# Patient Record
Sex: FEMALE | Race: WHITE | URBAN - METROPOLITAN AREA
[De-identification: names, ages, dates, MRNs, and addresses within clinical notes are randomized per-mention and may not be internally consistent; named-entity substitution may affect disease eponyms.]

---

## 2024-08-11 NOTE — PATIENT INSTRUCTIONS
Welcome to Dr. Tao's clinic. We are here to assist you through your ENT care at [facility]. Dr. Tao is an Ear surgeon. This means that she specializes in taking care of patients with complex ear problems.     Dr. Tao's office number is [phone]. While you may see her at a satellite office, she has a team committed to help meet your healthcare needs at [facility]'s main [place]. This number is the most direct way to communicate with the office.     Larissa is Dr. Tao's  and she answers the office phone from 8am-4pm Mon-Fri. She can help you with many general questions and information. Questions that she cannot answer will be directed to the appropriate staff. You may need to leave a message. In this case, someone from the team will call you back.     Sigifredo Bell RN, is Dr. Tao's primary nurse and can be reached by calling the office. Sigifredo is in clinic with Dr. Tao's on Mondays and Tuesdays. Non-urgent calls will be returned on non-clinic days typically Thursdays.     Sometimes, other team members will also be involved in your care. These people may include dieticians, social workers, speech therapists, audiologist, neurologist, and physical therapist. Dr. Tao will provide these referrals as needed. Please let her know if you would like to request a specific referral.     For your convenience, Dr. Tao sees patients at several [facility] locations including [facility] and [facility] at the main campus of [facility]. While we try to make your appointments as convenient as possible, occasionally a visit to another location may be necessary to provide the best care for you. We look forward to working with you to meet your healthcare goals.     Dr. Tao makes every effort to run on time for your appointments. Therefore, if you are more than 30 minutes late unrelated to a scan or another appointment such therapy or audio you will have to  reschedule.

## 2024-08-11 NOTE — PROGRESS NOTES
History Of Present Illness:  Juana Motley is a 49 y.o. female who presents to me as a new patient for eustachian tube dysfunction, referred here today by Dr. Lr. She has a history of pneumococcal meningitis treated at [place] in February 2024, presumed secondary to AOM as she had right ear pain in the 24hr preceding onset of meningitis symptoms. She had a right ear tube placed and has since been following up with Dr. Lr for post-hospital care. She said the tube was replaced twice because Dr. Lr said it was blocked and/or not working based on tympanometry. She was told there was concern for recurrent meningitis if her tube is not working appropriately, which led to her referral here today. She is having some right ear fullness but is otherwise without significant symptoms, including otalgia, otorrhea, and vertigo. She reports recurrent ear infections over her life. She does report having decreased hearing on the right for many years.     Past Medical History:  She has no past medical history on file.    Surgical History:  She has no past surgical history on file.     Social History:  She reports that she has quit smoking. Her smoking use included cigarettes. She uses smokeless tobacco. She reports that she does not drink alcohol and does not use drugs.    Family History:  No family history on file.     Medications:  Current Outpatient Medications   Medication Instructions    acetaminophen (TYLENOL) 650 mg    aspirin 81 mg, oral, Daily    atomoxetine (STRATTERA) 18 mg, oral, Every morning    fluconazole (Diflucan) 100 mg tablet 1 tablet, oral, Daily (0630)    fLuvoxaMINE (LUVOX) 100 mg, oral, Nightly    FreeStyle Ignacio 2 Sensor kit     hydroCHLOROthiazide (Microzide) 12.5 mg tablet     HYDROcodone-acetaminophen (Norco) 7.5-325 mg tablet 1 tablet, oral, Every 6 hours    OneTouch Delica Plus Lancet 30 gauge misc     OneTouch Verio test strips strip     rosuvastatin (CRESTOR) 5 mg    Synthroid 100 mcg  "tablet         Allergies:  Metformin, Penicillin, and Topiramate    Review of Systems:   A comprehensive 10-point review of systems was obtained including constitutional, neurological, HEENT, pulmonary, cardiovascular, genito-urinary, and other pertinent systems and was negative except as noted in the HPI.      Physical Exam:  Constitutional   General appearance: Healthy-appearing, well-nourished, well groomed, in no acute distress.   Ability to communicate: Normal communication without aids, normal voice quality.       Head and face: Atraumatic with no masses, lesions, or scarring.   Facial strength: Normal strength and symmetry, no synkinesis or facial tic.     Ears  Otoscopic examination: Narrow ear canals bilaterally; right PET in posterosuperior quadrant, appears occluded with cerumen, no obvious middle ear effusion. The ear tube was removed, revealing a layer of scar tissue deep to the tube that does not allow visualization into the middle ear space. The TM is mobile on pneumatic otoscopy.   Normal otoscopy of the left tympanic membrane, no effusion.      Nose: Dorsum symmetric with no visible or palpable deformities.     Oral Cavity/Mouth  Lips, teeth, and gums: Normal lips, gums, and dentition.     Oropharynx: Mucosa moist, no lesions.     Neck: Symmetrical, trachea midline. No masses visible.     Neurological/Psychiatric  Cranial Nerve Examination: II - XII grossly intact.  Orientation to person, place, and time: Normal.  Mood and affect: Normal.     Skin: Normal without rashes or lesions.     Pulmonary  Respiratory effort: Chest expands symmetrically.     Extremities: Appearance of extremities: Normal. Gait normal.     Procedure:  None    Last Recorded Vitals:  Temperature 36.5 °C (97.7 °F), height 1.676 m (5' 6\"), weight 96.8 kg (213 lb 4.8 oz).    Relevant Results:  I personally reviewed the audiogram from 8/12/24 which demonstrated left normal hearing with 92% WRS, type A tympanogram. On the right, " there is a mild conductive hearing loss greater in the lower frequencies, with 20dB ABG at 500Hz and 30db ABG at 1000Hz. %, Type vB tympanogram with normal ECV.     Assessment/Plan   49 y.o. female who presents to me as a new patient, referred here today by Dr. Lr, with right chronic otitis media and a history of pneumococcal meningitis treated in 02/2024. Her previously placed right ear tube was occluded and removed today. She has no obvious evidence of effusion, but there is a layer of scar tissue deep to the ear tube that precludes visualization of the middle ear space. Her conductive hearing loss on the right side may be related to scar tissue/adhesions and ossicular stiffening, but this would be better evaluated with CT imaging. She was also reassured that it is highly unlikely to have recurrent meningitis related to AOM, but her CT scan will also show us if she has any tegmen defects that could predispose her to these infections.     We ordered a CT IAC and Prevnar vaccine for her today. We will contact her after imaging is completed and discuss a follow-up plan according to the results.           I saw and evaluated the patient. I personally obtained the key and critical portions of the history and physical exam or was physically present for key and critical portions performed by the resident/fellow. I reviewed the resident/fellow's documentation and discussed the patient with the resident/fellow. I agree with the resident/fellow's medical decision making as documented in the note.    Cristela Tao MD

## 2024-08-12 ENCOUNTER — APPOINTMENT (OUTPATIENT)
Dept: OTOLARYNGOLOGY | Facility: CLINIC | Age: 49
End: 2024-08-12
Payer: COMMERCIAL

## 2024-08-12 ENCOUNTER — TELEPHONE (OUTPATIENT)
Dept: OTOLARYNGOLOGY | Facility: CLINIC | Age: 49
End: 2024-08-12

## 2024-08-12 ENCOUNTER — CLINICAL SUPPORT (OUTPATIENT)
Dept: AUDIOLOGY | Facility: CLINIC | Age: 49
End: 2024-08-12
Payer: COMMERCIAL

## 2024-08-12 VITALS — WEIGHT: 213.3 LBS | HEIGHT: 66 IN | TEMPERATURE: 97.7 F | BODY MASS INDEX: 34.28 KG/M2

## 2024-08-12 DIAGNOSIS — H65.31 CHRONIC MUCOID OTITIS MEDIA OF RIGHT EAR: ICD-10-CM

## 2024-08-12 DIAGNOSIS — H90.11 CONDUCTIVE HEARING LOSS OF RIGHT EAR WITH UNRESTRICTED HEARING OF LEFT EAR: ICD-10-CM

## 2024-08-12 DIAGNOSIS — T85.618A NON-FUNCTIONING TYMPANOSTOMY TUBE, INITIAL ENCOUNTER: Primary | ICD-10-CM

## 2024-08-12 DIAGNOSIS — H90.11 CONDUCTIVE HEARING LOSS OF RIGHT EAR WITH UNRESTRICTED HEARING OF LEFT EAR: Primary | ICD-10-CM

## 2024-08-12 DIAGNOSIS — G00.2: ICD-10-CM

## 2024-08-12 PROCEDURE — 92557 COMPREHENSIVE HEARING TEST: CPT

## 2024-08-12 PROCEDURE — 99205 OFFICE O/P NEW HI 60 MIN: CPT | Performed by: OTOLARYNGOLOGY

## 2024-08-12 PROCEDURE — 92504 EAR MICROSCOPY EXAMINATION: CPT | Performed by: OTOLARYNGOLOGY

## 2024-08-12 PROCEDURE — 3008F BODY MASS INDEX DOCD: CPT | Performed by: OTOLARYNGOLOGY

## 2024-08-12 PROCEDURE — 92550 TYMPANOMETRY & REFLEX THRESH: CPT

## 2024-08-12 RX ORDER — LANCETS 33 GAUGE
EACH MISCELLANEOUS
COMMUNITY
Start: 2023-11-24

## 2024-08-12 RX ORDER — PNEUMOCOCCAL VACCINE POLYVALENT 25; 25; 25; 25; 25; 25; 25; 25; 25; 25; 25; 25; 25; 25; 25; 25; 25; 25; 25; 25; 25; 25; 25 UG/.5ML; UG/.5ML; UG/.5ML; UG/.5ML; UG/.5ML; UG/.5ML; UG/.5ML; UG/.5ML; UG/.5ML; UG/.5ML; UG/.5ML; UG/.5ML; UG/.5ML; UG/.5ML; UG/.5ML; UG/.5ML; UG/.5ML; UG/.5ML; UG/.5ML; UG/.5ML; UG/.5ML; UG/.5ML; UG/.5ML
0.5 INJECTION, SOLUTION INTRAMUSCULAR; SUBCUTANEOUS ONCE
Qty: 0.5 ML | Refills: 0 | Status: SHIPPED | OUTPATIENT
Start: 2024-08-12 | End: 2024-08-12 | Stop reason: ENTERED-IN-ERROR

## 2024-08-12 RX ORDER — ATOMOXETINE 18 MG/1
18 CAPSULE ORAL EVERY MORNING
COMMUNITY
Start: 2024-08-02

## 2024-08-12 RX ORDER — ROSUVASTATIN CALCIUM 5 MG/1
5 TABLET, COATED ORAL
COMMUNITY
Start: 2022-11-14

## 2024-08-12 RX ORDER — LEVOTHYROXINE SODIUM 100 UG/1
TABLET ORAL
COMMUNITY
Start: 2024-05-14

## 2024-08-12 RX ORDER — FLUVOXAMINE MALEATE 100 MG/1
100 TABLET, COATED ORAL NIGHTLY
COMMUNITY

## 2024-08-12 RX ORDER — ASPIRIN 81 MG/1
81 TABLET ORAL DAILY
COMMUNITY

## 2024-08-12 RX ORDER — HYDROCODONE BITARTRATE AND ACETAMINOPHEN 7.5; 325 MG/1; MG/1
1 TABLET ORAL EVERY 6 HOURS
COMMUNITY
Start: 2024-07-23

## 2024-08-12 RX ORDER — FLUCONAZOLE 100 MG/1
1 TABLET ORAL
COMMUNITY
Start: 2024-02-23

## 2024-08-12 RX ORDER — HYDROCHLOROTHIAZIDE 12.5 MG/1
TABLET ORAL
COMMUNITY
Start: 2024-07-21

## 2024-08-12 RX ORDER — BLOOD-GLUCOSE METER
EACH MISCELLANEOUS
COMMUNITY
Start: 2023-12-31

## 2024-08-12 RX ORDER — GUAIFENESIN 1200 MG
650 TABLET, EXTENDED RELEASE 12 HR ORAL
COMMUNITY
Start: 2024-02-20

## 2024-08-12 RX ORDER — PNEUMOCOCCAL 20-VALENT CONJUGATE VACCINE 2.2; 2.2; 2.2; 2.2; 2.2; 2.2; 2.2; 2.2; 2.2; 2.2; 2.2; 2.2; 2.2; 2.2; 2.2; 2.2; 4.4; 2.2; 2.2; 2.2 UG/.5ML; UG/.5ML; UG/.5ML; UG/.5ML; UG/.5ML; UG/.5ML; UG/.5ML; UG/.5ML; UG/.5ML; UG/.5ML; UG/.5ML; UG/.5ML; UG/.5ML; UG/.5ML; UG/.5ML; UG/.5ML; UG/.5ML; UG/.5ML; UG/.5ML; UG/.5ML
0.5 INJECTION, SUSPENSION INTRAMUSCULAR ONCE
Qty: 0.5 ML | Refills: 0 | Status: SHIPPED | OUTPATIENT
Start: 2024-08-12 | End: 2024-08-12

## 2024-08-12 RX ORDER — FLASH GLUCOSE SENSOR
KIT MISCELLANEOUS
COMMUNITY
Start: 2024-05-28

## 2024-08-12 ASSESSMENT — PATIENT HEALTH QUESTIONNAIRE - PHQ9
SUM OF ALL RESPONSES TO PHQ9 QUESTIONS 1 AND 2: 0
2. FEELING DOWN, DEPRESSED OR HOPELESS: NOT AT ALL
1. LITTLE INTEREST OR PLEASURE IN DOING THINGS: NOT AT ALL

## 2024-08-12 ASSESSMENT — PAIN SCALES - GENERAL: PAINLEVEL: 0-NO PAIN

## 2024-08-12 NOTE — LETTER
August 12, 2024     Santiago Lr MD  [address] [facility]  [address]  [place] [address]    Patient: Juana Motley   YOB: 1975   Date of Visit: 8/12/2024       Dear Dr. Santiago Lr MD:    Thank you for referring Juana Motley to me for evaluation. Below are my notes for this consultation.  If you have questions, please do not hesitate to call me. I look forward to following your patient along with you.       Sincerely,     Cristela Tao MD      CC: No Recipients  ______________________________________________________________________________________    History Of Present Illness:  Juana Motley is a 49 y.o. female who presents to me as a new patient for eustachian tube dysfunction, referred here today by Dr. Lr. She has a history of pneumococcal meningitis treated at [place] in February 2024, presumed secondary to AOM as she had right ear pain in the 24hr preceding onset of meningitis symptoms. She had a right ear tube placed and has since been following up with Dr. Lr for post-hospital care. She said the tube was replaced twice because Dr. Lr said it was blocked and/or not working based on tympanometry. She was told there was concern for recurrent meningitis if her tube is not working appropriately, which led to her referral here today. She is having some right ear fullness but is otherwise without significant symptoms, including otalgia, otorrhea, and vertigo. She reports recurrent ear infections over her life. She does report having decreased hearing on the right for many years.     Past Medical History:  She has no past medical history on file.    Surgical History:  She has no past surgical history on file.     Social History:  She reports that she has quit smoking. Her smoking use included cigarettes. She uses smokeless tobacco. She reports that she does not drink alcohol and does not use drugs.    Family History:  No family history on  file.     Medications:  Current Outpatient Medications   Medication Instructions   • acetaminophen (TYLENOL) 650 mg   • aspirin 81 mg, oral, Daily   • atomoxetine (STRATTERA) 18 mg, oral, Every morning   • fluconazole (Diflucan) 100 mg tablet 1 tablet, oral, Daily (0630)   • fLuvoxaMINE (LUVOX) 100 mg, oral, Nightly   • FreeStyle Ignacio 2 Sensor kit    • hydroCHLOROthiazide (Microzide) 12.5 mg tablet    • HYDROcodone-acetaminophen (Norco) 7.5-325 mg tablet 1 tablet, oral, Every 6 hours   • OneTouch Delica Plus Lancet 30 gauge misc    • OneTouch Verio test strips strip    • rosuvastatin (CRESTOR) 5 mg   • Synthroid 100 mcg tablet         Allergies:  Metformin, Penicillin, and Topiramate    Review of Systems:   A comprehensive 10-point review of systems was obtained including constitutional, neurological, HEENT, pulmonary, cardiovascular, genito-urinary, and other pertinent systems and was negative except as noted in the HPI.      Physical Exam:  Constitutional   General appearance: Healthy-appearing, well-nourished, well groomed, in no acute distress.   Ability to communicate: Normal communication without aids, normal voice quality.       Head and face: Atraumatic with no masses, lesions, or scarring.   Facial strength: Normal strength and symmetry, no synkinesis or facial tic.     Ears  Otoscopic examination: Narrow ear canals bilaterally; right PET in posterosuperior quadrant, appears occluded with cerumen, no obvious middle ear effusion. The ear tube was removed, revealing a layer of scar tissue deep to the tube that does not allow visualization into the middle ear space. The TM is mobile on pneumatic otoscopy.   Normal otoscopy of the left tympanic membrane, no effusion.      Nose: Dorsum symmetric with no visible or palpable deformities.     Oral Cavity/Mouth  Lips, teeth, and gums: Normal lips, gums, and dentition.     Oropharynx: Mucosa moist, no lesions.     Neck: Symmetrical, trachea midline. No masses  "visible.     Neurological/Psychiatric  Cranial Nerve Examination: II - XII grossly intact.  Orientation to person, place, and time: Normal.  Mood and affect: Normal.     Skin: Normal without rashes or lesions.     Pulmonary  Respiratory effort: Chest expands symmetrically.     Extremities: Appearance of extremities: Normal. Gait normal.     Procedure:  None    Last Recorded Vitals:  Temperature 36.5 °C (97.7 °F), height 1.676 m (5' 6\"), weight 96.8 kg (213 lb 4.8 oz).    Relevant Results:  I personally reviewed the audiogram from 8/12/24 which demonstrated left normal hearing with 92% WRS, type A tympanogram. On the right, there is a mild conductive hearing loss greater in the lower frequencies, with 20dB ABG at 500Hz and 30db ABG at 1000Hz. %, Type vB tympanogram with normal ECV.     Assessment/Plan  49 y.o. female who presents to me as a new patient, referred here today by Dr. Lr, with right chronic otitis media and a history of pneumococcal meningitis treated in 02/2024. Her previously placed right ear tube was occluded and removed today. She has no obvious evidence of effusion, but there is a layer of scar tissue deep to the ear tube that precludes visualization of the middle ear space. Her conductive hearing loss on the right side may be related to scar tissue/adhesions and ossicular stiffening, but this would be better evaluated with CT imaging. She was also reassured that it is highly unlikely to have recurrent meningitis related to AOM, but her CT scan will also show us if she has any tegmen defects that could predispose her to these infections.     We ordered a CT IAC and Prevnar vaccine for her today. We will contact her after imaging is completed and discuss a follow-up plan according to the results.           I saw and evaluated the patient. I personally obtained the key and critical portions of the history and physical exam or was physically present for key and critical portions performed by " the resident/fellow. I reviewed the resident/fellow's documentation and discussed the patient with the resident/fellow. I agree with the resident/fellow's medical decision making as documented in the note.    Cristela Tao MD

## 2024-08-12 NOTE — PROGRESS NOTES
ADULT AUDIOLOGY EVALUATION    Name:  Juana Motley  :  1975  Age:  49 y.o.  Date of Evaluation:  24    IMPRESSIONS     Today's test results indicate normal middle ear functioning with normal hearing sensitivity in the left ear, and no measurable middle ear compliance, with normal to mild conductive hearing loss in the right ear. Word recognition is excellent in both ears.      RECOMMENDATIONS     Continue medical follow up with ENT.  Return for annual hearing evaluation in conjunction with medical management, or sooner if new concerns arise.    Time: 8692-9498    HISTORY     Juana Motley is seen today for an audiologic evaluation in conjunction with otolaryngology. Patient reports having meningitis in February, which originated form an infection in the mastoid bone. She has a history of eustachian tube dysfunction and frequent ear infections as a child, and presents today with a PE tube in the right ear. She mentioned that this is her third tube that has been placed in this ear. Additionally, Juana reported a history of right mastoidectomy, performed around 20 years ago.    Patient reports ongoing fullness and pressure in the right ear, as well as intermittent tinnitus. She endorsed experiencing dizziness in the past that she believes may be related to low blood pressure. She denied recent drainage from her ears. Patient denied otalgia, and history of loud noise exposure.       TEST RESULTS     Otoscopic Evaluation:  Right Ear: Clear ear canal, PE tube visualized and in place.  Left Ear: Clear ear canal with unremarkable tympanic membrane    Tympanometry:   Right Ear: Flat, type B tympanogram with normal ear canal volume, no peak pressure or compliance, consistent with blocked or extruded PE tube.   Left Ear: Normal, type A tympanogram with normal ear canal volume, peak pressure and compliance.     Ipsilateral Acoustic Reflexes:   Right Ear: Could not test due to PE tube status.  Left Ear:  Present 500-4000 Hz.     Pure Tone Audiometry (125-8000 Hz):     Right Ear: Mild conductive hearing loss from 125-1000 Hz, rising to normal hearing sensitivity at 2000 Hz, and falling back to mild from 2475-9204 Hz. 20-30 dB conductive component present in the low frequencies.    Left Ear: Normal hearing sensitivity from 125-8000 Hz. No air-bone gaps present.    Speech Audiometry:   Right Ear:    Speech Reception Threshold (SRT) was obtained at 30 dBHL using monitored live voice (MLV).   Word Recognition scores were excellent (100%) in quiet when words were presented at 70 dBHL using recorded NU-6 word list ordered by difficulty.  Left Ear:    Speech Reception Threshold (SRT) was obtained at 5 dBHL using monitored live voice (MLV).   Word Recognition scores were excellent (92%) in quiet when words were presented at 50 dBHL using recorded NU-6 word list ordered by difficulty.         Gisele Beinto, Ruel, CCC-A  Clinical Audiologist    Degree of Hearing Sensitivity Decibel Range   Within Normal Limits (WNL) 0-25   Mild 26-40   Moderate 41-55   Moderately-Severe 56-70   Severe 71-90   Profound 91+      Key   CNT/DNT Could Not Test/Did Not Test   TM Tympanic Membrane   WNL Within Normal Limits   HA Hearing Aid   SNHL Sensorineural Hearing Loss   CHL Conductive Hearing Loss   NIHL Noise-Induced Hearing Loss   ECV Ear Canal Volume   MLV Monitored Live Voice     AUDIOGRAM

## 2024-08-12 NOTE — TELEPHONE ENCOUNTER
Patient's insurance company contacted for CT IAC prior authorizations. Patient's case ID is [id number], authorization approved #[id number] from 8/12/24-02/08/25. CT order faxed to [facility] [phone].

## 2024-08-26 ENCOUNTER — HOSPITAL ENCOUNTER (OUTPATIENT)
Dept: RADIOLOGY | Facility: EXTERNAL LOCATION | Age: 49
Discharge: HOME | End: 2024-08-26
Payer: COMMERCIAL

## 2024-09-06 ENCOUNTER — APPOINTMENT (OUTPATIENT)
Dept: OTOLARYNGOLOGY | Facility: CLINIC | Age: 49
End: 2024-09-06
Payer: COMMERCIAL

## 2024-09-06 DIAGNOSIS — H66.11 CHRONIC TUBOTYMPANIC SUPPURATIVE OTITIS MEDIA OF RIGHT EAR: ICD-10-CM

## 2024-09-06 DIAGNOSIS — Z86.61 HISTORY OF BACTERIAL MENINGITIS: Primary | ICD-10-CM

## 2024-09-06 DIAGNOSIS — Q01.9 ENCEPHALOCELE (MULTI): ICD-10-CM

## 2024-09-06 PROCEDURE — 99213 OFFICE O/P EST LOW 20 MIN: CPT | Performed by: OTOLARYNGOLOGY

## 2024-09-06 ASSESSMENT — PATIENT HEALTH QUESTIONNAIRE - PHQ9
SUM OF ALL RESPONSES TO PHQ9 QUESTIONS 1 AND 2: 0
1. LITTLE INTEREST OR PLEASURE IN DOING THINGS: NOT AT ALL
2. FEELING DOWN, DEPRESSED OR HOPELESS: NOT AT ALL

## 2024-09-06 NOTE — PROGRESS NOTES
Virtual or Telephone Consent    An interactive audio and video telecommunication system which permits real time communications between the patient (at the originating site) and provider (at the distant site) was utilized to provide this telehealth service.   Verbal consent was requested and obtained from Juana Motley on this date, 09/06/24 for a telehealth visit.         History Of Present Illness:  Juana Motley is a 49 y.o. female with a history of meningitis and AOM  Here to discuss CT results via VV. .    Recall: Juana Motley is a 49 y.o. female who presents to me as a new patient for eustachian tube dysfunction, referred here today by Dr. Lr. 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 She had a right ear tube placed and has since been following up with Dr. Lr for post-hospital care. She said the tube was replaced twice because Dr. Lr said it was blocked and/or not working based on tympanometry. She was told there was concern for recurrent meningitis if her tube is not working appropriately, which led to her referral here today. She is having some right ear fullness but is otherwise without significant symptoms, including otalgia, otorrhea, and vertigo. She reports recurrent ear infections over her life. She does report having decreased hearing on the right for many years. :      Surgical History:  She has no past surgical history on file.     Allergies:  Metformin, Penicillin, and Topiramate    Medications:   Current Outpatient Medications   Medication Instructions    acetaminophen (TYLENOL) 650 mg    aspirin 81 mg, oral, Daily    atomoxetine (STRATTERA) 18 mg, oral, Every morning    fluconazole (Diflucan) 100 mg tablet 1 tablet, oral, Daily (0630)    fLuvoxaMINE (LUVOX) 50 mg, oral, Nightly    FreeStyle Ignacio 2 Sensor kit      hydroCHLOROthiazide (Microzide) 12.5 mg tablet     HYDROcodone-acetaminophen (Norco) 7.5-325 mg tablet 1 tablet, oral, Every 6 hours    OneTouch Delica Plus Lancet 30 gauge misc     OneTouch Verio test strips strip     rosuvastatin (CRESTOR) 5 mg    Synthroid 100 mcg tablet       Review of Systems:   A comprehensive 10-point review of systems was obtained including constitutional, neurological, HEENT, pulmonary, cardiovascular, genito-urinary, and other pertinent systems and was negative except as noted in the HPI.      Physical Exam:  Constitutional   General appearance: Healthy-appearing, well-nourished, well groomed, in no acute distress.   Ability to communicate: Normal communication without aids, normal voice quality.       Head and face: Atraumatic with no masses, lesions, or scarring.   Facial strength: Normal strength and symmetry, no synkinesis or facial tic.     Pulmonary  Respiratory effort: Chest expands symmetrically.       Relevant Results:  I reviewed the CT images which demonstrate a large tegmen tympani dehiscence and ossicles at the level of the edge of the bone. There is also soft tissue opacification of the ME space.      Assessment/Plan   48 yo with h/o meningitis and AOM. 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   We discussed the need for MCF and repair from above because of her anatomy and location of the defect.   We discussed the risks and benefits of MCF repair. These risks include damage to the ear bone, damage to the inner ear, hearing loss, dizziness, facial nerve injury, and taste disturbance. The patient would like to discuss this with her family and will contact me if she chooses to proceed with surgery. We did discuss the increased risk of recurrent meningitis with a persistent defect. She hasn't gotten the pneumovax but has RX for it.         Cristela Tao MD

## 2024-09-06 NOTE — LETTER
September 6, 2024       No Recipients    Patient: Juana Motley   YOB: 1975   Date of Visit: 9/6/2024       Dear Dr. Baxter Recipients:    I had the pleasure of seeing your patient, Juana Motley today. Below are my notes for this consultation.  If you have questions, please do not hesitate to call me. Thank you for allowing me to participate in the care of your patient.        Sincerely,     Cristela Tao MD      CC:   No Recipients  _____________________________________________________________________________    48 yo with h/o meningitis and AOM. 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   We discussed the need for MCF and repair from above because of her anatomy and location of the defect.   We discussed the risks and benefits of MCF repair. These risks include damage to the ear bone, damage to the inner ear, hearing loss, dizziness, facial nerve injury, and taste disturbance. The patient would like to discuss this with her family and will contact me if she chooses to proceed with surgery. We did discuss the increased risk of recurrent meningitis with a persistent defect. She hasn't gotten the pneumovax but has RX for it.

## 2024-10-02 DIAGNOSIS — Z86.61 HISTORY OF BACTERIAL MENINGITIS: ICD-10-CM

## 2024-10-02 DIAGNOSIS — Q01.9 ENCEPHALOCELE: ICD-10-CM

## 2024-10-02 DIAGNOSIS — H90.11 CONDUCTIVE HEARING LOSS OF RIGHT EAR WITH UNRESTRICTED HEARING OF LEFT EAR: ICD-10-CM

## 2024-10-03 ENCOUNTER — OFFICE VISIT (OUTPATIENT)
Dept: NEUROSURGERY | Facility: CLINIC | Age: 49
End: 2024-10-03
Payer: COMMERCIAL

## 2024-10-03 ENCOUNTER — PREP FOR PROCEDURE (OUTPATIENT)
Dept: NEUROSURGERY | Facility: CLINIC | Age: 49
End: 2024-10-03

## 2024-10-03 VITALS
HEART RATE: 82 BPM | HEIGHT: 65 IN | TEMPERATURE: 98.3 F | SYSTOLIC BLOOD PRESSURE: 129 MMHG | BODY MASS INDEX: 33.49 KG/M2 | WEIGHT: 201 LBS | DIASTOLIC BLOOD PRESSURE: 85 MMHG

## 2024-10-03 DIAGNOSIS — H66.11 CHRONIC TUBOTYMPANIC SUPPURATIVE OTITIS MEDIA OF RIGHT EAR: ICD-10-CM

## 2024-10-03 DIAGNOSIS — Q01.9 ENCEPHALOCELE: Primary | ICD-10-CM

## 2024-10-03 PROBLEM — H60.509 ACUTE OTITIS EXTERNA: Status: ACTIVE | Noted: 2024-10-03

## 2024-10-03 PROBLEM — E03.9 HYPOTHYROIDISM: Status: ACTIVE | Noted: 2023-09-18

## 2024-10-03 PROBLEM — R51.9 HEADACHE: Status: ACTIVE | Noted: 2024-10-03

## 2024-10-03 PROBLEM — K43.9 HERNIA OF ANTERIOR ABDOMINAL WALL: Status: ACTIVE | Noted: 2024-01-11

## 2024-10-03 PROBLEM — M67.50 PLICA SYNDROME: Status: ACTIVE | Noted: 2017-04-13

## 2024-10-03 PROBLEM — R26.9 ABNORMAL GAIT: Status: ACTIVE | Noted: 2024-10-03

## 2024-10-03 PROBLEM — J02.9 ACUTE PHARYNGITIS: Status: ACTIVE | Noted: 2024-08-19

## 2024-10-03 PROBLEM — I25.10 ATHEROSCLEROSIS OF CORONARY ARTERY: Status: ACTIVE | Noted: 2024-06-17

## 2024-10-03 PROBLEM — M54.12 CERVICAL RADICULOPATHY: Status: ACTIVE | Noted: 2023-05-18

## 2024-10-03 PROBLEM — E66.9 OBESITY: Status: ACTIVE | Noted: 2024-02-18

## 2024-10-03 PROBLEM — E88.810 METABOLIC SYNDROME X: Status: ACTIVE | Noted: 2024-10-03

## 2024-10-03 PROBLEM — M47.812 SPONDYLOSIS OF CERVICAL SPINE WITHOUT MYELOPATHY: Status: ACTIVE | Noted: 2023-05-18

## 2024-10-03 PROBLEM — F32.A DEPRESSIVE DISORDER: Status: ACTIVE | Noted: 2024-10-03

## 2024-10-03 PROBLEM — E11.9 TYPE 2 DIABETES MELLITUS: Status: ACTIVE | Noted: 2023-12-11

## 2024-10-03 PROBLEM — E55.9 VITAMIN D DEFICIENCY: Status: ACTIVE | Noted: 2024-10-03

## 2024-10-03 PROBLEM — K80.50 BILIARY COLIC: Status: ACTIVE | Noted: 2024-10-03

## 2024-10-03 PROBLEM — E07.9 DISORDER OF THYROID GLAND: Status: ACTIVE | Noted: 2024-10-03

## 2024-10-03 PROBLEM — E87.6 HYPOKALEMIA: Status: ACTIVE | Noted: 2024-02-17

## 2024-10-03 PROBLEM — J32.9 SINUSITIS: Status: ACTIVE | Noted: 2024-10-03

## 2024-10-03 PROBLEM — M54.40 LOW BACK PAIN WITH SCIATICA: Status: ACTIVE | Noted: 2023-05-18

## 2024-10-03 PROBLEM — H90.11 CONDUCTIVE HEARING LOSS, UNILATERAL, RIGHT EAR, WITH UNRESTRICTED HEARING ON THE CONTRALATERAL SIDE: Status: ACTIVE | Noted: 2024-08-16

## 2024-10-03 PROBLEM — M26.609 TEMPOROMANDIBULAR JOINT DISORDER: Status: ACTIVE | Noted: 2024-10-03

## 2024-10-03 PROBLEM — R41.82 ALTERED MENTAL STATUS: Status: ACTIVE | Noted: 2024-02-13

## 2024-10-03 PROBLEM — I10 ESSENTIAL HYPERTENSION: Status: ACTIVE | Noted: 2024-10-03

## 2024-10-03 PROBLEM — R00.1 SINUS BRADYCARDIA: Status: ACTIVE | Noted: 2024-02-18

## 2024-10-03 PROBLEM — G03.9 MENINGITIS (HHS-HCC): Status: ACTIVE | Noted: 2024-02-17

## 2024-10-03 PROBLEM — H66.90 ACUTE OTITIS MEDIA: Status: ACTIVE | Noted: 2024-02-17

## 2024-10-03 PROBLEM — E78.2 MIXED HYPERLIPIDEMIA: Status: ACTIVE | Noted: 2024-06-17

## 2024-10-03 PROCEDURE — 99214 OFFICE O/P EST MOD 30 MIN: CPT | Mod: 57 | Performed by: NEUROLOGICAL SURGERY

## 2024-10-03 PROCEDURE — 3074F SYST BP LT 130 MM HG: CPT | Performed by: NEUROLOGICAL SURGERY

## 2024-10-03 PROCEDURE — 3079F DIAST BP 80-89 MM HG: CPT | Performed by: NEUROLOGICAL SURGERY

## 2024-10-03 PROCEDURE — 3008F BODY MASS INDEX DOCD: CPT | Performed by: NEUROLOGICAL SURGERY

## 2024-10-03 PROCEDURE — 99204 OFFICE O/P NEW MOD 45 MIN: CPT | Performed by: NEUROLOGICAL SURGERY

## 2024-10-03 RX ORDER — BLOOD-GLUCOSE TRANSMITTER
EACH MISCELLANEOUS
COMMUNITY
Start: 2024-08-17

## 2024-10-03 RX ORDER — BLOOD-GLUCOSE,RECEIVER,CONT
EACH MISCELLANEOUS
COMMUNITY
Start: 2024-08-14

## 2024-10-03 RX ORDER — CLONAZEPAM 1 MG/1
0.5 TABLET ORAL
COMMUNITY
Start: 2023-12-20

## 2024-10-03 RX ORDER — TIRZEPATIDE 2.5 MG/.5ML
2.5 INJECTION, SOLUTION SUBCUTANEOUS
COMMUNITY
Start: 2022-11-14

## 2024-10-03 RX ORDER — AMITRIPTYLINE HYDROCHLORIDE 25 MG/1
50 TABLET, FILM COATED ORAL
COMMUNITY
Start: 2024-02-14

## 2024-10-03 RX ORDER — METHYLPREDNISOLONE 4 MG/1
TABLET ORAL
COMMUNITY
Start: 2024-08-19

## 2024-10-03 RX ORDER — NYSTATIN 100000 [USP'U]/ML
SUSPENSION ORAL
COMMUNITY
Start: 2024-02-20

## 2024-10-03 RX ORDER — FLUOCINOLONE ACETONIDE 0.11 MG/ML
OIL AURICULAR (OTIC)
COMMUNITY
Start: 2024-05-17

## 2024-10-03 RX ORDER — BLOOD-GLUCOSE SENSOR
EACH MISCELLANEOUS
COMMUNITY
Start: 2024-09-13

## 2024-10-03 RX ORDER — MUPIROCIN 20 MG/G
OINTMENT TOPICAL
COMMUNITY
Start: 2024-01-04

## 2024-10-03 RX ORDER — CIPROFLOXACIN AND DEXAMETHASONE 3; 1 MG/ML; MG/ML
SUSPENSION/ DROPS AURICULAR (OTIC)
COMMUNITY

## 2024-10-03 RX ORDER — NEOMYCIN SULFATE, POLYMYXIN B SULFATE AND HYDROCORTISONE 10; 3.5; 1 MG/ML; MG/ML; [USP'U]/ML
SUSPENSION/ DROPS AURICULAR (OTIC)
COMMUNITY
Start: 2024-02-20

## 2024-10-03 RX ORDER — GUAIFENESIN AND DEXTROMETHORPHAN HYDROBROMIDE 600; 30 MG/1; MG/1
1 TABLET, EXTENDED RELEASE ORAL
COMMUNITY
Start: 2024-08-19

## 2024-10-03 RX ORDER — DIAZEPAM 10 MG/1
TABLET ORAL
COMMUNITY
Start: 2024-05-02

## 2024-10-03 RX ORDER — CLINDAMYCIN HYDROCHLORIDE 300 MG/1
CAPSULE ORAL
COMMUNITY
Start: 2024-08-19

## 2024-10-03 RX ORDER — TOPIRAMATE 50 MG/1
TABLET, FILM COATED ORAL
COMMUNITY
Start: 2024-01-02

## 2024-10-03 RX ORDER — VANCOMYCIN 2 G/400ML
INJECTION, SOLUTION INTRAVENOUS
COMMUNITY
Start: 2024-02-20

## 2024-10-03 RX ORDER — KETOCONAZOLE 20 MG/ML
SHAMPOO, SUSPENSION TOPICAL
COMMUNITY
Start: 2024-07-16

## 2024-10-03 RX ORDER — LORAZEPAM 2 MG/1
2 TABLET ORAL
COMMUNITY
Start: 2024-02-14

## 2024-10-03 RX ORDER — LACTOBACILLUS ACIDOPHILUS / LACTOBACILLUS BULGARICUS 100 MILLION CFU STRENGTH
GRANULES ORAL
COMMUNITY
Start: 2024-02-21

## 2024-10-03 RX ORDER — DOXYCYCLINE HYCLATE 100 MG
100 TABLET ORAL
COMMUNITY
Start: 2024-01-05

## 2024-10-03 RX ORDER — CEFDINIR 300 MG/1
300 CAPSULE ORAL
COMMUNITY
Start: 2024-02-12

## 2024-10-03 RX ORDER — ACYCLOVIR 800 MG/1
1 TABLET ORAL
COMMUNITY
Start: 2024-02-19

## 2024-10-03 RX ORDER — POTASSIUM CHLORIDE 750 MG/1
CAPSULE, EXTENDED RELEASE ORAL
COMMUNITY
Start: 2023-07-10

## 2024-10-03 RX ORDER — FLUOXETINE HYDROCHLORIDE 40 MG/1
40 CAPSULE ORAL
COMMUNITY
Start: 2022-11-14

## 2024-10-03 RX ORDER — NYSTATIN AND TRIAMCINOLONE ACETONIDE 100000; 1 [USP'U]/G; MG/G
CREAM TOPICAL
COMMUNITY
Start: 2024-03-12

## 2024-10-03 RX ORDER — SPIRONOLACTONE 25 MG/1
25 TABLET ORAL
COMMUNITY
Start: 2024-02-14

## 2024-10-03 RX ORDER — IBUPROFEN 800 MG/1
800 TABLET ORAL
COMMUNITY
Start: 2024-02-12

## 2024-10-03 ASSESSMENT — PAIN SCALES - GENERAL: PAINLEVEL: 0-NO PAIN

## 2024-10-03 NOTE — PROGRESS NOTES
"[facility]  Neurosurgery    History of Present Illness      Juana Motley is a 49-year-old female with a PMH significant for CAD on ASA (EF 55-60% 06/2024), HTN, DMT2, hypothyroidism, cervical radiculopathy, migraine headache, and tobacco use. Patient presented for evaluation back in February for AMS, neck pain, and ear pain. She underwent CTH of her head that was negative for acute findings. Given concern for meningitis a LP was performed and notable for meningitis. ENT was consulted during hospital stay for otogenic mastoiditis and loop PE tube was placed. Patient was started on vancomycin and meropenem by ID. She was able to be discharged home to ceftriaxone 2g BID. Patient was referred to neurotology given concern for recurrent meningitis. 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 She was recommended for a R MCF by Dr. Tao and presents to clinic for surgical discussion.         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    Given patient age, symptoms, etc. I recommend consideration for right MCF repair with Dr Tao with possible lumbar " drain    Risks of treatment and alternatives discussed in detail (bleeding, infection, paralysis, stroke, death, observation, etc.) and patient agrees to proceed as noted above.    I also discussed that I perform overlapping surgical cases but would be present for all critical portions of the surgical procedure.    Medical History     No past medical history on file.  No past surgical history on file.  Social History     Tobacco Use    Smoking status: Former     Types: Cigarettes    Smokeless tobacco: Current    Tobacco comments:     Nicotine vape    Substance Use Topics    Alcohol use: Never    Drug use: Never     No family history on file.  Allergies   Allergen Reactions    Metformin Diarrhea    Penicillin Other    Topiramate Drowsiness and Itching     Current Outpatient Medications   Medication Instructions    acetaminophen (TYLENOL) 650 mg    acyclovir (Zovirax) 800 mg tablet 1 tablet, oral, 3 times daily (0900,1400,1900)    amitriptyline (ELAVIL) 50 mg    aspirin 81 mg, oral, Daily    atomoxetine (STRATTERA) 18 mg, oral, Every morning    cefdinir (OMNICEF) 300 mg, oral    ciprofloxacin-dexamethasone (CiproDEX) otic suspension INSTILL 3-4 DROPS INTO AFFECTED EAR TWICE DAILY    clindamycin (Cleocin) 300 mg capsule TAKE 2 CAPSULES BY MOUTH EVERY 8 HOURS FOR 7 DAYS    clonazePAM (KLONOPIN) 0.5 mg, oral    Dexcom G6  misc as directed    Dexcom G6 Sensor device USE AS DIRECTED AND CHANGE SENSOR EVERY 10 DAYS    Dexcom G6 Transmitter device as directed    diazePAM (Valium) 10 mg tablet TAKE 1 TABLET 1 HOUR PRIOR TO PROCEDURE    doxycycline (VIBRA-TABS) 100 mg, oral    FERROUS GLUCONATE ORAL 65 mg, oral    Floranex 100 million cell packet DISSOLVE 1 PACKET IN LIQUID AND TAKE BY MOUTH THREE TIMES DAILY FOR 10 DAYS.    fluconazole (Diflucan) 100 mg tablet 1 tablet, oral, Daily (0630)    fluocinolone (DermOtic) 0.01 % ear drops instill 3 to 4 drops INTO AFFECTED EAR(S) every 3 days    FLUoxetine (PROZAC) 40 mg     fLuvoxaMINE (LUVOX) 50 mg, oral, Nightly    FreeStyle Ignacio 2 Sensor kit     hydroCHLOROthiazide (Microzide) 12.5 mg tablet     HYDROcodone-acetaminophen (Norco) 7.5-325 mg tablet 1 tablet, oral, Every 6 hours    ibuprofen 800 mg, oral    ketoconazole (NIZOral) 2 % shampoo APPLY TOPICALLY 2 TIMES EVERY WEEK    LORazepam (ATIVAN) 2 mg, oral    methylPREDNISolone (Medrol Dospak) 4 mg tablets follow package directions    Mounjaro 2.5 mg, subcutaneous    Mucinex DM  mg 12 hr tablet 1 tablet, oral, Every 12 hours scheduled (0630,1830)    mupirocin (Bactroban) 2 % ointment APPLY SMALL AMOUNT TOPICALLY TO THE AFFECTED AREA TWICE DAILY    neomycin-polymyxin-HC (Cortisporin) 3.5-10,000-1 mg/mL-unit/mL-% otic suspension SHAKE LIQUID AND INSTILL 3 DROPS TO AFFECTED EAR THREE TIMES DAILY FOR 7 DAYS    nystatin (Mycostatin) 100,000 unit/mL suspension SHAKE LIQUID AND TAKE 5 ML BY MOUTH FOUR TIMES DAILY FOR 7 DAYS    nystatin-triamcinolone (Mycolog II) cream APPLY 1 GRAM TOPICALLY TO THE AFFECTED AREA DAILY FOR 5 DAYS    OneTouch Delica Plus Lancet 30 gauge misc     OneTouch Verio test strips strip     potassium chloride ER (Micro-K) 10 mEq ER capsule oral    rosuvastatin (CRESTOR) 5 mg    spironolactone (ALDACTONE) 25 mg    Synthroid 100 mcg tablet     topiramate (Topamax) 50 mg tablet     vancomycin (Xellia) IVPB intravenous

## 2024-10-10 ENCOUNTER — APPOINTMENT (OUTPATIENT)
Dept: NEUROSURGERY | Facility: CLINIC | Age: 49
End: 2024-10-10
Payer: COMMERCIAL

## 2024-10-25 ENCOUNTER — CLINICAL SUPPORT (OUTPATIENT)
Dept: PREADMISSION TESTING | Facility: HOSPITAL | Age: 49
End: 2024-10-25
Payer: COMMERCIAL

## 2024-11-01 ENCOUNTER — PRE-ADMISSION TESTING (OUTPATIENT)
Dept: PREADMISSION TESTING | Facility: HOSPITAL | Age: 49
End: 2024-11-01
Payer: COMMERCIAL

## 2024-11-01 VITALS
TEMPERATURE: 97.3 F | HEART RATE: 75 BPM | DIASTOLIC BLOOD PRESSURE: 84 MMHG | SYSTOLIC BLOOD PRESSURE: 135 MMHG | BODY MASS INDEX: 34.32 KG/M2 | HEIGHT: 65 IN | RESPIRATION RATE: 16 BRPM | WEIGHT: 206 LBS

## 2024-11-01 DIAGNOSIS — H90.11 CONDUCTIVE HEARING LOSS OF RIGHT EAR WITH UNRESTRICTED HEARING OF LEFT EAR: ICD-10-CM

## 2024-11-01 DIAGNOSIS — Z86.61 HISTORY OF BACTERIAL MENINGITIS: ICD-10-CM

## 2024-11-01 DIAGNOSIS — Q01.9 ENCEPHALOCELE: Primary | ICD-10-CM

## 2024-11-01 LAB
ABO GROUP (TYPE) IN BLOOD: NORMAL
ANION GAP SERPL CALC-SCNC: 13 MMOL/L (ref 10–20)
ANTIBODY SCREEN: NORMAL
APTT PPP: 30 SECONDS (ref 27–38)
BUN SERPL-MCNC: 11 MG/DL (ref 6–23)
CALCIUM SERPL-MCNC: 9 MG/DL (ref 8.6–10.6)
CHLORIDE SERPL-SCNC: 102 MMOL/L (ref 98–107)
CO2 SERPL-SCNC: 28 MMOL/L (ref 21–32)
CREAT SERPL-MCNC: 0.69 MG/DL (ref 0.5–1.05)
EGFRCR SERPLBLD CKD-EPI 2021: >90 ML/MIN/1.73M*2
ERYTHROCYTE [DISTWIDTH] IN BLOOD BY AUTOMATED COUNT: 13.2 % (ref 11.5–14.5)
GLUCOSE SERPL-MCNC: 87 MG/DL (ref 74–99)
HCT VFR BLD AUTO: 42.6 % (ref 36–46)
HGB BLD-MCNC: 14.2 G/DL (ref 12–16)
INR PPP: 1 (ref 0.9–1.1)
MCH RBC QN AUTO: 31 PG (ref 26–34)
MCHC RBC AUTO-ENTMCNC: 33.3 G/DL (ref 32–36)
MCV RBC AUTO: 93 FL (ref 80–100)
NRBC BLD-RTO: 0 /100 WBCS (ref 0–0)
PLATELET # BLD AUTO: 274 X10*3/UL (ref 150–450)
POTASSIUM SERPL-SCNC: 4.2 MMOL/L (ref 3.5–5.3)
PROTHROMBIN TIME: 10.8 SECONDS (ref 9.8–12.8)
RBC # BLD AUTO: 4.58 X10*6/UL (ref 4–5.2)
RH FACTOR (ANTIGEN D): NORMAL
SODIUM SERPL-SCNC: 139 MMOL/L (ref 136–145)
WBC # BLD AUTO: 6.9 X10*3/UL (ref 4.4–11.3)

## 2024-11-01 PROCEDURE — 85610 PROTHROMBIN TIME: CPT

## 2024-11-01 PROCEDURE — 87081 CULTURE SCREEN ONLY: CPT

## 2024-11-01 PROCEDURE — 80048 BASIC METABOLIC PNL TOTAL CA: CPT

## 2024-11-01 PROCEDURE — 85027 COMPLETE CBC AUTOMATED: CPT

## 2024-11-01 PROCEDURE — 99204 OFFICE O/P NEW MOD 45 MIN: CPT | Performed by: NURSE PRACTITIONER

## 2024-11-01 PROCEDURE — 86901 BLOOD TYPING SEROLOGIC RH(D): CPT

## 2024-11-01 PROCEDURE — 36415 COLL VENOUS BLD VENIPUNCTURE: CPT

## 2024-11-01 RX ORDER — CHLORHEXIDINE GLUCONATE ORAL RINSE 1.2 MG/ML
15 SOLUTION DENTAL AS NEEDED
Qty: 473 ML | Refills: 0 | Status: SHIPPED | OUTPATIENT
Start: 2024-11-01 | End: 2024-11-03

## 2024-11-01 RX ORDER — CHLORHEXIDINE GLUCONATE 40 MG/ML
SOLUTION TOPICAL DAILY PRN
Qty: 473 ML | Refills: 0 | Status: SHIPPED | OUTPATIENT
Start: 2024-11-01 | End: 2024-11-06

## 2024-11-01 ASSESSMENT — DUKE ACTIVITY SCORE INDEX (DASI)
CAN YOU PARTICIPATE IN STRENOUS SPORTS LIKE SWIMMING, SINGLES TENNIS, FOOTBALL, BASKETBALL, OR SKIING: YES
CAN YOU DO YARD WORK LIKE RAKING LEAVES, WEEDING OR PUSHING A MOWER: YES
CAN YOU WALK INDOORS, SUCH AS AROUND YOUR HOUSE: YES
CAN YOU DO HEAVY WORK AROUND THE HOUSE LIKE SCRUBBING FLOORS OR LIFTING AND MOVING HEAVY FURNITURE: YES
CAN YOU TAKE CARE OF YOURSELF (EAT, DRESS, BATHE, OR USE TOILET): YES
CAN YOU WALK A BLOCK OR TWO ON LEVEL GROUND: YES
CAN YOU PARTICIPATE IN MODERATE RECREATIONAL ACTIVITIES LIKE GOLF, BOWLING, DANCING, DOUBLES TENNIS OR THROWING A BASEBALL OR FOOTBALL: YES
CAN YOU DO LIGHT WORK AROUND THE HOUSE LIKE DUSTING OR WASHING DISHES: YES
DASI METS SCORE: 8.9
CAN YOU DO MODERATE WORK AROUND THE HOUSE LIKE VACUUMING, SWEEPING FLOORS OR CARRYING GROCERIES: YES
CAN YOU CLIMB A FLIGHT OF STAIRS OR WALK UP A HILL: YES
CAN YOU HAVE SEXUAL RELATIONS: YES
TOTAL_SCORE: 50.2
CAN YOU RUN A SHORT DISTANCE: NO

## 2024-11-01 ASSESSMENT — ENCOUNTER SYMPTOMS
MYALGIAS: 1
RESPIRATORY NEGATIVE: 1
CARDIOVASCULAR NEGATIVE: 1
ARTHRALGIAS: 1
GASTROINTESTINAL NEGATIVE: 1
NECK STIFFNESS: 1
LIGHT-HEADEDNESS: 1
CONSTITUTIONAL NEGATIVE: 1
SINUS CONGESTION: 1
ENDOCRINE NEGATIVE: 1

## 2024-11-01 ASSESSMENT — LIFESTYLE VARIABLES: SMOKING_STATUS: SMOKER

## 2024-11-01 NOTE — H&P (VIEW-ONLY)
CPM/PAT Evaluation       Name: Juana Motley (Juana Motley)  /Age: 1975/49 y.o.     Visit Type:   In-Person       Chief Complaint: Encephalocele  Conductive hearing loss of right ear with unrestricted hearing of left ear  History of bacterial meningitis    HPI  Patient is a 49-year-old female with large bony defect and encephalocele. Pt is being evaluated in CPM in anticipation of a right side middle cranial fossa approach for encephalopathy, tegmen defect repair and possible lumbar drain for CSF leak with Dr. Pedro and Dr. Tao on 24.  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    EAR TUBE REMOVAL  2024    and right myringotomy with tube (T-Style) placement    HERNIA REPAIR  2024    MYRINGOTOMY W/ TUBES  2024    TONSILLECTOMY         Patient  has no history on file for sexual activity.    No family history on file.    Allergies   Allergen Reactions    Metformin Diarrhea    Penicillin Other    Topiramate Drowsiness and Itching       Prior to Admission medications    Medication Sig Start Date End Date Taking? Authorizing Provider   acetaminophen (TylenoL)  325 mg capsule 2 capsules (650 mg). 2/20/24   Historical Provider, MD   acyclovir (Zovirax) 800 mg tablet Take 1 tablet (800 mg) by mouth 3 times a day. 2/19/24   Historical Provider, MD   amitriptyline (Elavil) 25 mg tablet 2 tablets (50 mg). 2/14/24   Historical Provider, MD   aspirin 81 mg EC tablet Take 1 tablet (81 mg) by mouth once daily.    Historical Provider, MD   atomoxetine (Strattera) 18 mg capsule Take 1 capsule (18 mg) by mouth once daily in the morning. 8/2/24   Historical Provider, MD   cefdinir (Omnicef) 300 mg capsule Take 1 capsule (300 mg) by mouth. 2/12/24   Historical Provider, MD   ciprofloxacin-dexamethasone (CiproDEX) otic suspension INSTILL 3-4 DROPS INTO AFFECTED EAR TWICE DAILY    Historical Provider, MD   clindamycin (Cleocin) 300 mg capsule TAKE 2 CAPSULES BY MOUTH EVERY 8 HOURS FOR 7 DAYS 8/19/24   Historical Provider, MD   clonazePAM (KlonoPIN) 1 mg tablet Take 0.5 tablets (0.5 mg) by mouth. 12/20/23   Historical Provider, MD   Dexcom G6  misc as directed 8/14/24   Historical Provider, MD   Dexcom G6 Sensor device USE AS DIRECTED AND CHANGE SENSOR EVERY 10 DAYS 9/13/24   Historical Provider, MD   Dexcom G6 Transmitter device as directed 8/17/24   Historical Provider, MD   diazePAM (Valium) 10 mg tablet TAKE 1 TABLET 1 HOUR PRIOR TO PROCEDURE 5/2/24   Historical Provider, MD   doxycycline (Vibra-Tabs) 100 mg tablet Take 1 tablet (100 mg) by mouth. 1/5/24   Historical Provider, MD   FERROUS GLUCONATE ORAL Take 65 mg by mouth. 12/20/23   Historical Provider, MD   Floranex 100 million cell packet DISSOLVE 1 PACKET IN LIQUID AND TAKE BY MOUTH THREE TIMES DAILY FOR 10 DAYS. 2/21/24   Historical Provider, MD   fluconazole (Diflucan) 100 mg tablet Take 1 tablet (100 mg) by mouth early in the morning.. 2/23/24   Historical Provider, MD   fluocinolone (DermOtic) 0.01 % ear drops instill 3 to 4 drops INTO AFFECTED EAR(S) every 3 days 5/17/24   Historical Provider, MD   FLUoxetine  (PROzac) 40 mg capsule 1 capsule (40 mg). 11/14/22   Historical Provider, MD   fLuvoxaMINE (Luvox) 100 mg tablet Take 0.5 tablets (50 mg) by mouth once daily at bedtime.    Historical Provider, MD   FreeStyle Ignacio 2 Sensor kit  5/28/24   Historical Provider, MD   hydroCHLOROthiazide (Microzide) 12.5 mg tablet  7/21/24   Historical Provider, MD   HYDROcodone-acetaminophen (Norco) 7.5-325 mg tablet Take 1 tablet by mouth every 6 hours. 7/23/24   Historical Provider, MD   ibuprofen 800 mg tablet Take 1 tablet (800 mg) by mouth. 2/12/24   Historical Provider, MD   ketoconazole (NIZOral) 2 % shampoo APPLY TOPICALLY 2 TIMES EVERY WEEK 7/16/24   Historical Provider, MD   LORazepam (Ativan) 2 mg tablet Take 1 tablet (2 mg) by mouth. 2/14/24   Historical Provider, MD   methylPREDNISolone (Medrol Dospak) 4 mg tablets follow package directions 8/19/24   Historical Provider, MD   Mucinex DM  mg 12 hr tablet Take 1 tablet by mouth every 12 hours. 8/19/24   Historical Provider, MD   mupirocin (Bactroban) 2 % ointment APPLY SMALL AMOUNT TOPICALLY TO THE AFFECTED AREA TWICE DAILY 1/4/24   Historical Provider, MD   neomycin-polymyxin-HC (Cortisporin) 3.5-10,000-1 mg/mL-unit/mL-% otic suspension SHAKE LIQUID AND INSTILL 3 DROPS TO AFFECTED EAR THREE TIMES DAILY FOR 7 DAYS 2/20/24   Historical Provider, MD   nystatin (Mycostatin) 100,000 unit/mL suspension SHAKE LIQUID AND TAKE 5 ML BY MOUTH FOUR TIMES DAILY FOR 7 DAYS 2/20/24   Historical Provider, MD   nystatin-triamcinolone (Mycolog II) cream APPLY 1 GRAM TOPICALLY TO THE AFFECTED AREA DAILY FOR 5 DAYS 3/12/24   Historical Provider, MD   OneTouch Delica Plus Lancet 30 gauge misc  11/24/23   Historical Provider, MD   OneTouch Verio test strips strip  12/31/23   Historical Provider, MD   potassium chloride ER (Micro-K) 10 mEq ER capsule Take by mouth. 7/10/23   Historical Provider, MD   rosuvastatin (Crestor) 5 mg tablet 1 tablet (5 mg). 11/14/22   Historical Provider, MD    spironolactone (Aldactone) 25 mg tablet 1 tablet (25 mg). 2/14/24   Historical Provider, MD   Synthroid 100 mcg tablet  5/14/24   Historical Provider, MD   tirzepatide (Mounjaro) 2.5 mg/0.5 mL pen injector Inject 2.5 mg under the skin. 11/14/22   Historical Provider, MD   topiramate (Topamax) 50 mg tablet  1/2/24   Historical Provider, MD   vancomycin (Xellia) IVPB Infuse into a venous catheter. 2/20/24   Historical Provider, MD IBRAHIM ROS:   Constitutional:   neg    Neuro/Psych:    light-headedness  Eyes:    visual disturbance  Ears:    hearing loss   tinnitus  Nose:    sinus congestion  Mouth:   neg    Throat:   neg    Neck:    neck stiffness  Cardio:   neg    Respiratory:   neg    Endocrine:   neg    GI:   neg    :   neg    Musculoskeletal:    arthralgias   myalgias  Hematologic:   neg    Skin:  neg        Physical Exam  Constitutional:       Appearance: She is obese.   HENT:      Head: Normocephalic and atraumatic.      Nose: Nose normal.      Mouth/Throat:      Mouth: Mucous membranes are moist.   Cardiovascular:      Rate and Rhythm: Normal rate and regular rhythm.      Pulses: Normal pulses.      Heart sounds: Normal heart sounds.   Pulmonary:      Effort: Pulmonary effort is normal.      Breath sounds: Normal breath sounds.   Abdominal:      Palpations: Abdomen is soft.   Musculoskeletal:         General: Normal range of motion.      Cervical back: Normal range of motion.   Skin:     General: Skin is warm.   Neurological:      General: No focal deficit present.      Mental Status: She is alert and oriented to person, place, and time.   Psychiatric:         Mood and Affect: Mood normal.         Behavior: Behavior normal.          PAT AIRWAY:   Airway:     Mallampati::  II    TM distance::  >3 FB    Neck ROM::  Full   upper dentures and partials      There were no vitals taken for this visit.    DASI Risk Score      Flowsheet Row Questionnaire Series Submission from 10/2/2024 in [facility] with  Generic Provider Mychart   Can you take care of yourself (eat, dress, bathe, or use toilet)?  2.75  filed at 10/02/2024 2029   Can you walk indoors, such as around your house? 1.75  filed at 10/02/2024 2029   Can you walk a block or two on level ground?  2.75  filed at 10/02/2024 2029   Can you climb a flight of stairs or walk up a hill? 5.5  filed at 10/02/2024 2029   Can you run a short distance? 0  filed at 10/02/2024 2029   Can you do light work around the house like dusting or washing dishes? 2.7  filed at 10/02/2024 2029   Can you do moderate work around the house like vacuuming, sweeping floors or carrying groceries? 3.5  filed at 10/02/2024 2029   Can you do heavy work around the house like scrubbing floors or lifting and moving heavy furniture?  8  filed at 10/02/2024 2029   Can you do yard work like raking leaves, weeding or pushing a mower? 4.5  filed at 10/02/2024 2029   Can you have sexual relations? 5.25  filed at 10/02/2024 2029   Can you participate in moderate recreational activities like golf, bowling, dancing, doubles tennis or throwing a baseball or football? 6  filed at 10/02/2024 2029   Can you participate in strenous sports like swimming, singles tennis, football, basketball, or skiing? 0  filed at 10/02/2024 2029   DASI SCORE 42.7  filed at 10/02/2024 2029   METS Score (Will be calculated only when all the questions are answered) 8  filed at 10/02/2024 2029          Caprini DVT Assessment    No data to display       Modified Frailty Index    No data to display       CHADS2 Stroke Risk  Current as of today        N/A 3 to 100%: High Risk   2 to < 3%: Medium Risk   0 to < 2%: Low Risk     Last Change: N/A          This score determines the patient's risk of having a stroke if the patient has atrial fibrillation.        This score is not applicable to this patient. Components are not calculated.          Revised Cardiac Risk Index    No data to display       Apfel Simplified Score    No data to  display       Risk Analysis Index Results This Encounter    No data found in the last 10 encounters.       Stop Bang Score      Flowsheet Row Questionnaire Series Submission from 10/2/2024 in [facility] with Generic Provider Bettie   Do you snore loudly? 0  filed at 10/02/2024 2029   Do you often feel tired or fatigued after your sleep? 0  filed at 10/02/2024 2029   Has anyone ever observed you stop breathing in your sleep? 0  filed at 10/02/2024 2029   Do you have or are you being treated for high blood pressure? 1  filed at 10/02/2024 2029   Recent BMI (Calculated) 34.4  filed at 10/02/2024 2029   Is BMI greater than 35 kg/m2? 0=No  filed at 10/02/2024 2029   Age older than 50 years old? 0=No  filed at 10/02/2024 2029   Gender - Male 0=No  filed at 10/02/2024 2029          Prodigy: High Risk  Total Score: 3              Prodigy Previous Opioid Use Score           ARISCAT Score for Postoperative Pulmonary Complications    No data to display       Lawrence Perioperative Risk for Myocardial Infarction or Cardiac Arrest (PATRIA)    No data to display         Assessment and Plan:     Anesthesia  The patient notes anesthesia complications in the past related to pt states she was combative postop     Neurology  The patient has encephalocele, CSF leak. The patient is at increased risk for postoperative delirium secondary to depression, sensory Impairment. The patient is at increased risk for perioperative stroke secondary to hypertension , female gender, diabetes mellitus, general anesthesia, operative time >2.5 hours. Handouts for preoperative brain exercises given to patient.    HEENT/Airway  No diagnoses, significant findings on chart review, clinical presentation, or evaluation. No documented or reported history of airway difficulty.     Cardiovascular  The patient is scheduled for non-cardiac surgery associated with elevated risk. The patient has no major cardiac contraindications to non- cardiac surgery.  RCRI  The  patient meets 0 RCRI criteria and therefor has a 3.9% risk of major adverse cardiac complications.  METS  The patient's functional capacity capacity is greater than 4 METS.  EKG  The patient has no EKG or echocardiographic changes concerning for myocardial ischemia.   Heart Failure  The patient has no known history of heart failure.  Additionally, the patient reports no symptoms of heart failure and demonstrates no signs of heart failure.  Hypertension Evaluation  The patient has a known history of hypertension that is controlled.  Patient's hypertension is most consistent with stage 1.  Heart Rhythm Evaluation  The patient has no history of arrhythmias.  Heart Valve Evaluation  The patient has no known history of valvular heart disease. The patient has no symptoms or physical exam findings to suggest valvular heart disease.  Cardiology Evaluation  The patient follows with cardiology, Dr. Sharma. Patient was last seen 11-5-24. Per note, pt ok to proceed with surgery.    The patient has a 30-day risk for MACE of 0 predictors, 3.9% risk for cardiac death, nonfatal myocardial infarction, and nonfatal cardiac arrest.  PATRIA score which indicates a 0.2% risk of intraoperative or 30-day postoperative MACE (major adverse cardiac event).    Pulmonary  The patient has JUAN. The patient is at increased risk of perioperative pulmonary complications secondary to neurosurgery, JUAN.    The patient has a stop bang score of 2, which places patient at low risk for having JUAN.    ARISCAT 23, low, 1.6% risk of in-hospital postoperative pulmonary complications  PRODIGY 0, low risk of respiratory depression episode. Patient given PI sheet for preoperative deep breathing exercises.    Hematology  No diagnoses or significant findings on chart review or clinical presentation and evaluation.  Antiplatelet management   The patient is currently receiving antiplatelet therapy for CAD.  Anticoagulation management  The patient is not currently  receiving anticoagulation therapy.    Caprini score 9, high risk of perioperative VTE.     Patient instructed to ambulate as soon as possible postoperatively to decrease thromboembolic risk. Initiate mechanical DVT prophylaxis as soon as possible and initiate chemical prophylaxis when deemed safe from a bleeding standpoint post surgery.     Transfusion Evaluation  A type and screen was obtained given the likelihood for perioperative transfusion of blood or blood products.    Gastrointestinal  No diagnoses or significant findings on chart review or clinical presentation and evaluation.    Eat 10- 0,  self-perceived oropharyngeal dysphagia scale (0-40)     Genitourinary  The patient has nephrolithiasis    Renal  No renal diagnoses or significant findings on chart review or clinical presentation and evaluation. The patient has specific risk factors associated with increased risk of perioperative renal complications related to hypertension, diabetes mellitus.    Musculoskeletal  The patient has peripheral neuropathy    Endocrine  Diabetes Evaluation  The patient has a history of diabetes mellitus that currently appears controlled.  Thyroid Disease Evaluation  The patient has a history of thyroid disease that appears controlled.    ID  No diagnoses or significant findings on chart review or clinical presentation and evaluation. MRSA screening obtained. Prescriptions and instructions given for Hibiclens and Peridex.    -Preoperative medication instructions were provided and reviewed with the patient.  Any additional testing or evaluation was explained to the patient.  NPO Instructions were discussed, and the patient's questions were answered prior to conclusion of this encounter. Patient verbalized understanding of preoperative instructions. After Visit Summary given.      Recent Results (from the past week)   Staphylococcus aureus/MRSA colonization, Culture    Collection Time: 11/01/24  3:47 PM    Specimen:  Nares/Axilla/Groin; Swab   Result Value Ref Range    Staph/MRSA Screen Culture No Staphylococcus aureus isolated    CBC    Collection Time: 11/01/24  3:47 PM   Result Value Ref Range    WBC 6.9 4.4 - 11.3 x10*3/uL    nRBC 0.0 0.0 - 0.0 /100 WBCs    RBC 4.58 4.00 - 5.20 x10*6/uL    Hemoglobin 14.2 12.0 - 16.0 g/dL    Hematocrit 42.6 36.0 - 46.0 %    MCV 93 80 - 100 fL    MCH 31.0 26.0 - 34.0 pg    MCHC 33.3 32.0 - 36.0 g/dL    RDW 13.2 11.5 - 14.5 %    Platelets 274 150 - 450 x10*3/uL   Basic Metabolic Panel    Collection Time: 11/01/24  3:47 PM   Result Value Ref Range    Glucose 87 74 - 99 mg/dL    Sodium 139 136 - 145 mmol/L    Potassium 4.2 3.5 - 5.3 mmol/L    Chloride 102 98 - 107 mmol/L    Bicarbonate 28 21 - 32 mmol/L    Anion Gap 13 10 - 20 mmol/L    Urea Nitrogen 11 6 - 23 mg/dL    Creatinine 0.69 0.50 - 1.05 mg/dL    eGFR >90 >60 mL/min/1.73m*2    Calcium 9.0 8.6 - 10.6 mg/dL   Type And Screen    Collection Time: 11/01/24  3:47 PM   Result Value Ref Range    ABO TYPE A     Rh TYPE POS     ANTIBODY SCREEN NEG    Coagulation Screen    Collection Time: 11/01/24  3:47 PM   Result Value Ref Range    Protime 10.8 9.8 - 12.8 seconds    INR 1.0 0.9 - 1.1    aPTT 30 27 - 38 seconds

## 2024-11-01 NOTE — PREPROCEDURE INSTRUCTIONS
Fasting Guidelines    NPO Instructions:    Do not eat any food after midnight the night before your surgery/procedure.  You may have up to TEN ounces of clear liquids until TWO hours before your instructed arrival time to the hospital. This includes water, black tea/coffee, (no milk or cream), apple juice, and/or electrolyte drinks (Gatorade).  You may chew gum up to TWO hours before your surgery/procedure.    Additional Instructions:     We have sent a prescription for Hibiclens soap and Peridex mouth wash to your preferred pharmacy.  If you have not already, Please  your prescription and start using as directed before surgery.  Follow the instruction sheet provided to you at your CPM/PAT appointment.    Avoid herbal supplements, multivitamins and NSAIDS (non-steroidal anti-inflammatory drugs) such as Advil, Aleve, Ibuprofen, Naproxen, Excedrin, Meloxicam or Celebrex for at least 7 days prior to surgery. May take Tylenol as needed.    Avoid tobacco and alcohol products for 24 hours prior to surgery.    CONTACT SURGEON'S OFFICE IF YOU DEVELOP:  * Fever = 100.4 F   * New respiratory symptoms (e.g. cough, shortness of breath, respiratory distress, sore throat)  * Recent loss of taste or smell  *Flu like symptoms such as headache, fatigue or gastrointestinal symptoms  * You develop any open sores, shingles, burning or painful urination   AND/OR:  * You no longer wish to have the surgery.  * Any other personal circumstances change that may lead to the need to cancel or defer this surgery.  *You were admitted to any hospital within one week of your planned procedure.    Seven/Six Days before Surgery:  Review your medication instructions, stop indicated medications    Day of Surgery:  Review your medication instructions, take indicated medications  Wear comfortable loose fitting clothing  Do not use moisturizers, creams, lotions or perfume  All jewelry and valuables should be left at home      [facility]  [phone]           Patient Information: Pre-Operative Infection Prevention Measures     Why did I have my nose, under my arms, and groin swabbed?  The purpose of the swab is to identify Staphylococcus aureus inside your nose or on your skin.  The swab was sent to the laboratory for culture.  A positive swab/culture for Staphylococcus aureus is called colonization or carriage.      What is Staphylococcus aureus?  Staphylococcus aureus, also known as “staph”, is a germ found on the skin or in the nose of healthy people.  Sometimes Staphylococcus aureus can get into the body and cause an infection.  This can be minor (such as pimples, boils, or other skin problems).  It might also be serious (such as a blood infection, pneumonia, or a surgical site infection).    What is Staphylococcus aureus colonization or carriage?  Colonization or carriage means that a person has the germ but is not sick from it.  These bacteria can be spread on the hands or when breathing or sneezing.    How is Staphylococcus aureus spread?  It is most often spread by close contact with a person or item that carries it.    What happens if my culture is positive for Staphylococcus aureus?  Your doctor/medical team will use this information to guide any antibiotic treatment which may be necessary.  Regardless of the culture results, we will clean the inside of your nose with a betadine swab just before you have your surgery.      Will I get an infection if I have Staphylococcus aureus in my nose or on my skin?  Anyone can get an infection with Staphylococcus aureus.  However, the best way to reduce your risk of infection is to follow the instructions provided to you for the use of your CHG soap and dental rinse.        Patient Information: Oral/Dental Rinse    What is oral/dental rinse?   It is a mouthwash. It is a way of cleaning the mouth with a germ-killing solution before your surgery.  The solution contains  chlorhexidine, commonly known as CHG.   It is used inside the mouth to kill a bacteria known as Staphylococcus aureus.  Let your doctor know if you are allergic to Chlorhexidine.    Why do I need to use CHG oral/dental rinse?  The CHG oral/dental rinse helps to kill a bacteria in your mouth known as Staphylococcus aureus.     This reduces the risk of infection at the surgical site.      Using your CHG oral/dental rinse  STEPS:  Use your CHG oral/dental rinse after you brush your teeth the night before (at bedtime) and the morning of your surgery.  Follow all directions on your prescription label.    Use the cap on the container to measure 15ml   Swish (gargle if you can) the mouthwash in your mouth for at least 30 seconds, (do not swallow) and spit out  After you use your CHG rinse, do not rinse your mouth with water, drink or eat.  Please refer to the prescription label for the appropriate time to resume oral intake      What side effects might I have using the CHG oral/dental rinse?  CHG rinse will stick to plaque on the teeth.  Brush and floss just before use.  Teeth brushing will help avoid staining of plaque during use.      Patient Information: Home Preoperative Antibacterial Shower      What is a home preoperative antibacterial shower?  This shower is a way of cleaning the skin with a germ-killing solution before surgery.  The solution contains chlorhexidine, commonly known as CHG.  CHG is a skin cleanser with germ-killing ability.  Let your doctor know if you are allergic to chlorhexidine.    Why do I need to take a preoperative antibacterial shower?  Skin is not sterile.  It is best to try to make your skin as free of germs as possible before surgery.  Proper cleansing with a germ-killing soap before surgery can lower the number of germs on your skin.  This helps to reduce the risk of infection at the surgical site.  Following the instructions listed below will help you prepare your skin for surgery.       How do I use the solution?  Steps:  Begin using your CHG soap 5 days before your scheduled surgery on ________________________.    First, wash and rinse your hair using the CHG soap. Keep CHG soap away from ear canals and eyes.  Rinse completely, do not condition.  Hair extensions should be removed.  Wash your face with your normal soap and rinse.    Apply the CHG solution to a clean wet washcloth.  Turn the water off or move away from the water spray to avoid premature rinsing of the CHG soap as you are applying.   Firmly lather your entire body from the neck down.  Do not use on your face.  Pay special attention to the area(s) where your incision(s) will be located unless they are on your face.  Avoid scrubbing your skin too hard.  The important point is to have the CHG soap sit on your skin for 3 minutes.    When the 3 minutes are up, turn on the water and rinse the CHG solution off your body completely.   DO NOT wash with regular soap after you have used the CHG soap solution  Pat yourself dry with a clean, freshly-laundered towel.  DO NOT apply powders, deodorants, or lotions.  Dress in clean, freshly laundered nightclothes.    Be sure to sleep with clean, freshly laundered sheets.  Be aware that CHG will cause stains on fabrics; if you wash them with bleach after use.  Rinse your washcloth and other linens that have contact with CHG completely.  Use only non-chlorine detergents to launder the items used.   The morning of surgery is the fifth day.  Repeat the above steps and dress in clean comfortable clothing     Whom should I contact if I have any questions regarding the use of CHG soap?  Call the [facility], [facility] at [phone] if you have any questions.               Preoperative Brain Exercises    What are brain exercises?  A brain exercise is any activity that engages your thinking (cognitive) skills.    What types of activities are  considered brain exercises?  Jigsaw puzzles, crossword puzzles, word jumble, memory games, word search, and many more.  Many can be found free online or on your phone via a mobile sari.    Why should I do brain exercises before my surgery?  More recent research has shown brain exercise before surgery can lower the risk of postoperative delirium (confusion) which can be especially important for older adults.  Patients who did brain exercises for 5 to 10 hours the days before surgery, cut their risk of postoperative delirium in half up to 1 week after surgery.         [facility]    Preoperative Deep Breathing Exercises    Why it is important to do deep breathing exercises before my surgery?  Deep breathing exercises strengthen your breathing muscles.  This helps you to recover after your surgery and decreases the chance of breathing complications.      How are the deep breathing exercises done?  Sit straight with your back supported.  Breathe in deeply and slowly through your nose. Your lower rib cage should expand and your abdomen may move forward.  Hold that breath for 3 to 5 seconds.  Breathe out through pursed lips, slowly and completely.  Rest and repeat 10 times every hour while awake.  Rest longer if you become dizzy or lightheaded.         Patient and Family Education             Ways You Can Help Prevent Blood Clots             This handout explains some simple things you can do to help prevent blood clots.      Blood clots are blockages that can form in the body's veins. When a blood clot forms in your deep veins, it may be called a deep vein thrombosis, or DVT for short. Blood clots can happen in any part of the body where blood flows, but they are most common in the arms and legs. If a piece of a blood clot breaks free and travels to the lungs, it is called a pulmonary embolus (PE). A PE can be a very serious problem.         Being in the hospital or having surgery can raise your  chances of getting a blood clot because you may not be well enough to move around as much as you normally do.         Ways you can help prevent blood clots in the hospital         Wearing SCDs. SCDs stands for Sequential Compression Devices.   SCDs are special sleeves that wrap around your legs  They attach to a pump that fills them with air to gently squeeze your legs every few minutes.   This helps return the blood in your legs to your heart.   SCDs should only be taken off when walking or bathing.   SCDs may not be comfortable, but they can help save your life.               Wearing compression stockings - if your doctor orders them. These special snug fitting stockings gently squeeze your legs to help blood flow.       Walking. Walking helps move the blood in your legs.   If your doctor says it is ok, try walking the halls at least   5 times a day. Ask us to help you get up, so you don't fall.      Taking any blood thinning medicines your doctor orders.        Page 1 of 2     [facility]; 3/23   Ways you can help prevent blood clots at home       Wearing compression stockings - if your doctor orders them. ? Walking - to help move the blood in your legs.       Taking any blood thinning medicines your doctor orders.      Signs of a blood clot or PE      Tell your doctor or nurse know right away if you have of the problems listed below.    If you are at home, seek medical care right away. Call 911 for chest pain or problems breathing.               Signs of a blood clot (DVT) - such as pain,  swelling, redness or warmth in your arm or leg      Signs of a pulmonary embolism (PE) - such as chest     pain or feeling short of breath

## 2024-11-01 NOTE — CPM/PAT H&P
CPM/PAT Evaluation       Name: Juana Motley (Juana Motley)  /Age: 1975/49 y.o.     Visit Type:   In-Person       Chief Complaint: Encephalocele  Conductive hearing loss of right ear with unrestricted hearing of left ear  History of bacterial meningitis    HPI  Patient is a 49-year-old female with large bony defect and encephalocele. Pt is being evaluated in CPM in anticipation of a right side middle cranial fossa approach for encephalopathy, tegmen defect repair and possible lumbar drain for CSF leak with Dr. Pedro and Dr. Tao on 24.  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     Depression     Dizziness     Encephalocele     HL (hearing loss)     Hypertension     Hypothyroidism     Migraine     Nephrolithiasis     Peripheral neuropathy     Pneumococcal meningitis ([facility]) 2024    Sleep apnea     non compliant with cpap    TMJ (dislocation of temporomandibular joint)     Tobacco use     Type 2 diabetes mellitus     Vitamin D deficiency        Past Surgical History:   Procedure Laterality Date    CARDIAC CATHETERIZATION       SECTION, LOW TRANSVERSE      CHOLECYSTECTOMY      DILATION AND CURETTAGE OF UTERUS      ?    EAR TUBE REMOVAL  2024    and right myringotomy with tube (T-Style) placement    HERNIA REPAIR  2024    MYRINGOTOMY W/ TUBES  2024    TONSILLECTOMY         Patient  has no history on file for sexual activity.    No family history on file.    Allergies   Allergen Reactions    Metformin Diarrhea    Penicillin Other    Topiramate Drowsiness and Itching       Prior to Admission medications    Medication Sig Start Date End Date Taking? Authorizing Provider   acetaminophen (TylenoL)  325 mg capsule 2 capsules (650 mg). 2/20/24   Historical Provider, MD   acyclovir (Zovirax) 800 mg tablet Take 1 tablet (800 mg) by mouth 3 times a day. 2/19/24   Historical Provider, MD   amitriptyline (Elavil) 25 mg tablet 2 tablets (50 mg). 2/14/24   Historical Provider, MD   aspirin 81 mg EC tablet Take 1 tablet (81 mg) by mouth once daily.    Historical Provider, MD   atomoxetine (Strattera) 18 mg capsule Take 1 capsule (18 mg) by mouth once daily in the morning. 8/2/24   Historical Provider, MD   cefdinir (Omnicef) 300 mg capsule Take 1 capsule (300 mg) by mouth. 2/12/24   Historical Provider, MD   ciprofloxacin-dexamethasone (CiproDEX) otic suspension INSTILL 3-4 DROPS INTO AFFECTED EAR TWICE DAILY    Historical Provider, MD   clindamycin (Cleocin) 300 mg capsule TAKE 2 CAPSULES BY MOUTH EVERY 8 HOURS FOR 7 DAYS 8/19/24   Historical Provider, MD   clonazePAM (KlonoPIN) 1 mg tablet Take 0.5 tablets (0.5 mg) by mouth. 12/20/23   Historical Provider, MD   Dexcom G6  misc as directed 8/14/24   Historical Provider, MD   Dexcom G6 Sensor device USE AS DIRECTED AND CHANGE SENSOR EVERY 10 DAYS 9/13/24   Historical Provider, MD   Dexcom G6 Transmitter device as directed 8/17/24   Historical Provider, MD   diazePAM (Valium) 10 mg tablet TAKE 1 TABLET 1 HOUR PRIOR TO PROCEDURE 5/2/24   Historical Provider, MD   doxycycline (Vibra-Tabs) 100 mg tablet Take 1 tablet (100 mg) by mouth. 1/5/24   Historical Provider, MD   FERROUS GLUCONATE ORAL Take 65 mg by mouth. 12/20/23   Historical Provider, MD   Floranex 100 million cell packet DISSOLVE 1 PACKET IN LIQUID AND TAKE BY MOUTH THREE TIMES DAILY FOR 10 DAYS. 2/21/24   Historical Provider, MD   fluconazole (Diflucan) 100 mg tablet Take 1 tablet (100 mg) by mouth early in the morning.. 2/23/24   Historical Provider, MD   fluocinolone (DermOtic) 0.01 % ear drops instill 3 to 4 drops INTO AFFECTED EAR(S) every 3 days 5/17/24   Historical Provider, MD   FLUoxetine  (PROzac) 40 mg capsule 1 capsule (40 mg). 11/14/22   Historical Provider, MD   fLuvoxaMINE (Luvox) 100 mg tablet Take 0.5 tablets (50 mg) by mouth once daily at bedtime.    Historical Provider, MD   FreeStyle Ignacio 2 Sensor kit  5/28/24   Historical Provider, MD   hydroCHLOROthiazide (Microzide) 12.5 mg tablet  7/21/24   Historical Provider, MD   HYDROcodone-acetaminophen (Norco) 7.5-325 mg tablet Take 1 tablet by mouth every 6 hours. 7/23/24   Historical Provider, MD   ibuprofen 800 mg tablet Take 1 tablet (800 mg) by mouth. 2/12/24   Historical Provider, MD   ketoconazole (NIZOral) 2 % shampoo APPLY TOPICALLY 2 TIMES EVERY WEEK 7/16/24   Historical Provider, MD   LORazepam (Ativan) 2 mg tablet Take 1 tablet (2 mg) by mouth. 2/14/24   Historical Provider, MD   methylPREDNISolone (Medrol Dospak) 4 mg tablets follow package directions 8/19/24   Historical Provider, MD   Mucinex DM  mg 12 hr tablet Take 1 tablet by mouth every 12 hours. 8/19/24   Historical Provider, MD   mupirocin (Bactroban) 2 % ointment APPLY SMALL AMOUNT TOPICALLY TO THE AFFECTED AREA TWICE DAILY 1/4/24   Historical Provider, MD   neomycin-polymyxin-HC (Cortisporin) 3.5-10,000-1 mg/mL-unit/mL-% otic suspension SHAKE LIQUID AND INSTILL 3 DROPS TO AFFECTED EAR THREE TIMES DAILY FOR 7 DAYS 2/20/24   Historical Provider, MD   nystatin (Mycostatin) 100,000 unit/mL suspension SHAKE LIQUID AND TAKE 5 ML BY MOUTH FOUR TIMES DAILY FOR 7 DAYS 2/20/24   Historical Provider, MD   nystatin-triamcinolone (Mycolog II) cream APPLY 1 GRAM TOPICALLY TO THE AFFECTED AREA DAILY FOR 5 DAYS 3/12/24   Historical Provider, MD   OneTouch Delica Plus Lancet 30 gauge misc  11/24/23   Historical Provider, MD   OneTouch Verio test strips strip  12/31/23   Historical Provider, MD   potassium chloride ER (Micro-K) 10 mEq ER capsule Take by mouth. 7/10/23   Historical Provider, MD   rosuvastatin (Crestor) 5 mg tablet 1 tablet (5 mg). 11/14/22   Historical Provider, MD    spironolactone (Aldactone) 25 mg tablet 1 tablet (25 mg). 2/14/24   Historical Provider, MD   Synthroid 100 mcg tablet  5/14/24   Historical Provider, MD   tirzepatide (Mounjaro) 2.5 mg/0.5 mL pen injector Inject 2.5 mg under the skin. 11/14/22   Historical Provider, MD   topiramate (Topamax) 50 mg tablet  1/2/24   Historical Provider, MD   vancomycin (Xellia) IVPB Infuse into a venous catheter. 2/20/24   Historical Provider, MD IBRAHIM ROS:   Constitutional:   neg    Neuro/Psych:    light-headedness  Eyes:    visual disturbance  Ears:    hearing loss   tinnitus  Nose:    sinus congestion  Mouth:   neg    Throat:   neg    Neck:    neck stiffness  Cardio:   neg    Respiratory:   neg    Endocrine:   neg    GI:   neg    :   neg    Musculoskeletal:    arthralgias   myalgias  Hematologic:   neg    Skin:  neg        Physical Exam  Constitutional:       Appearance: She is obese.   HENT:      Head: Normocephalic and atraumatic.      Nose: Nose normal.      Mouth/Throat:      Mouth: Mucous membranes are moist.   Cardiovascular:      Rate and Rhythm: Normal rate and regular rhythm.      Pulses: Normal pulses.      Heart sounds: Normal heart sounds.   Pulmonary:      Effort: Pulmonary effort is normal.      Breath sounds: Normal breath sounds.   Abdominal:      Palpations: Abdomen is soft.   Musculoskeletal:         General: Normal range of motion.      Cervical back: Normal range of motion.   Skin:     General: Skin is warm.   Neurological:      General: No focal deficit present.      Mental Status: She is alert and oriented to person, place, and time.   Psychiatric:         Mood and Affect: Mood normal.         Behavior: Behavior normal.          PAT AIRWAY:   Airway:     Mallampati::  II    TM distance::  >3 FB    Neck ROM::  Full   upper dentures and partials      There were no vitals taken for this visit.    DASI Risk Score      Flowsheet Row Questionnaire Series Submission from 10/2/2024 in [facility] with  Generic Provider Mychart   Can you take care of yourself (eat, dress, bathe, or use toilet)?  2.75  filed at 10/02/2024 2029   Can you walk indoors, such as around your house? 1.75  filed at 10/02/2024 2029   Can you walk a block or two on level ground?  2.75  filed at 10/02/2024 2029   Can you climb a flight of stairs or walk up a hill? 5.5  filed at 10/02/2024 2029   Can you run a short distance? 0  filed at 10/02/2024 2029   Can you do light work around the house like dusting or washing dishes? 2.7  filed at 10/02/2024 2029   Can you do moderate work around the house like vacuuming, sweeping floors or carrying groceries? 3.5  filed at 10/02/2024 2029   Can you do heavy work around the house like scrubbing floors or lifting and moving heavy furniture?  8  filed at 10/02/2024 2029   Can you do yard work like raking leaves, weeding or pushing a mower? 4.5  filed at 10/02/2024 2029   Can you have sexual relations? 5.25  filed at 10/02/2024 2029   Can you participate in moderate recreational activities like golf, bowling, dancing, doubles tennis or throwing a baseball or football? 6  filed at 10/02/2024 2029   Can you participate in strenous sports like swimming, singles tennis, football, basketball, or skiing? 0  filed at 10/02/2024 2029   DASI SCORE 42.7  filed at 10/02/2024 2029   METS Score (Will be calculated only when all the questions are answered) 8  filed at 10/02/2024 2029          Caprini DVT Assessment    No data to display       Modified Frailty Index    No data to display       CHADS2 Stroke Risk  Current as of today        N/A 3 to 100%: High Risk   2 to < 3%: Medium Risk   0 to < 2%: Low Risk     Last Change: N/A          This score determines the patient's risk of having a stroke if the patient has atrial fibrillation.        This score is not applicable to this patient. Components are not calculated.          Revised Cardiac Risk Index    No data to display       Apfel Simplified Score    No data to  display       Risk Analysis Index Results This Encounter    No data found in the last 10 encounters.       Stop Bang Score      Flowsheet Row Questionnaire Series Submission from 10/2/2024 in [facility] with Generic Provider Bettie   Do you snore loudly? 0  filed at 10/02/2024 2029   Do you often feel tired or fatigued after your sleep? 0  filed at 10/02/2024 2029   Has anyone ever observed you stop breathing in your sleep? 0  filed at 10/02/2024 2029   Do you have or are you being treated for high blood pressure? 1  filed at 10/02/2024 2029   Recent BMI (Calculated) 34.4  filed at 10/02/2024 2029   Is BMI greater than 35 kg/m2? 0=No  filed at 10/02/2024 2029   Age older than 50 years old? 0=No  filed at 10/02/2024 2029   Gender - Male 0=No  filed at 10/02/2024 2029          Prodigy: High Risk  Total Score: 3              Prodigy Previous Opioid Use Score           ARISCAT Score for Postoperative Pulmonary Complications    No data to display       Lawrence Perioperative Risk for Myocardial Infarction or Cardiac Arrest (PATRIA)    No data to display         Assessment and Plan:     Anesthesia  The patient notes anesthesia complications in the past related to pt states she was combative postop     Neurology  The patient has encephalocele, CSF leak. The patient is at increased risk for postoperative delirium secondary to depression, sensory Impairment. The patient is at increased risk for perioperative stroke secondary to hypertension , female gender, diabetes mellitus, general anesthesia, operative time >2.5 hours. Handouts for preoperative brain exercises given to patient.    HEENT/Airway  No diagnoses, significant findings on chart review, clinical presentation, or evaluation. No documented or reported history of airway difficulty.     Cardiovascular  The patient is scheduled for non-cardiac surgery associated with elevated risk. The patient has no major cardiac contraindications to non- cardiac surgery.  RCRI  The  patient meets 0 RCRI criteria and therefor has a 3.9% risk of major adverse cardiac complications.  METS  The patient's functional capacity capacity is greater than 4 METS.  EKG  The patient has no EKG or echocardiographic changes concerning for myocardial ischemia.   Heart Failure  The patient has no known history of heart failure.  Additionally, the patient reports no symptoms of heart failure and demonstrates no signs of heart failure.  Hypertension Evaluation  The patient has a known history of hypertension that is controlled.  Patient's hypertension is most consistent with stage 1.  Heart Rhythm Evaluation  The patient has no history of arrhythmias.  Heart Valve Evaluation  The patient has no known history of valvular heart disease. The patient has no symptoms or physical exam findings to suggest valvular heart disease.  Cardiology Evaluation  The patient follows with cardiology, Dr. Sharma. Patient was last seen 11-5-24. Per note, pt ok to proceed with surgery.    The patient has a 30-day risk for MACE of 0 predictors, 3.9% risk for cardiac death, nonfatal myocardial infarction, and nonfatal cardiac arrest.  PATRIA score which indicates a 0.2% risk of intraoperative or 30-day postoperative MACE (major adverse cardiac event).    Pulmonary  The patient has UJAN. The patient is at increased risk of perioperative pulmonary complications secondary to neurosurgery, JUAN.    The patient has a stop bang score of 2, which places patient at low risk for having JUAN.    ARISCAT 23, low, 1.6% risk of in-hospital postoperative pulmonary complications  PRODIGY 0, low risk of respiratory depression episode. Patient given PI sheet for preoperative deep breathing exercises.    Hematology  No diagnoses or significant findings on chart review or clinical presentation and evaluation.  Antiplatelet management   The patient is currently receiving antiplatelet therapy for CAD.  Anticoagulation management  The patient is not currently  receiving anticoagulation therapy.    Caprini score 9, high risk of perioperative VTE.     Patient instructed to ambulate as soon as possible postoperatively to decrease thromboembolic risk. Initiate mechanical DVT prophylaxis as soon as possible and initiate chemical prophylaxis when deemed safe from a bleeding standpoint post surgery.     Transfusion Evaluation  A type and screen was obtained given the likelihood for perioperative transfusion of blood or blood products.    Gastrointestinal  No diagnoses or significant findings on chart review or clinical presentation and evaluation.    Eat 10- 0,  self-perceived oropharyngeal dysphagia scale (0-40)     Genitourinary  The patient has nephrolithiasis    Renal  No renal diagnoses or significant findings on chart review or clinical presentation and evaluation. The patient has specific risk factors associated with increased risk of perioperative renal complications related to hypertension, diabetes mellitus.    Musculoskeletal  The patient has peripheral neuropathy    Endocrine  Diabetes Evaluation  The patient has a history of diabetes mellitus that currently appears controlled.  Thyroid Disease Evaluation  The patient has a history of thyroid disease that appears controlled.    ID  No diagnoses or significant findings on chart review or clinical presentation and evaluation. MRSA screening obtained. Prescriptions and instructions given for Hibiclens and Peridex.    -Preoperative medication instructions were provided and reviewed with the patient.  Any additional testing or evaluation was explained to the patient.  NPO Instructions were discussed, and the patient's questions were answered prior to conclusion of this encounter. Patient verbalized understanding of preoperative instructions. After Visit Summary given.      Recent Results (from the past week)   Staphylococcus aureus/MRSA colonization, Culture    Collection Time: 11/01/24  3:47 PM    Specimen:  Nares/Axilla/Groin; Swab   Result Value Ref Range    Staph/MRSA Screen Culture No Staphylococcus aureus isolated    CBC    Collection Time: 11/01/24  3:47 PM   Result Value Ref Range    WBC 6.9 4.4 - 11.3 x10*3/uL    nRBC 0.0 0.0 - 0.0 /100 WBCs    RBC 4.58 4.00 - 5.20 x10*6/uL    Hemoglobin 14.2 12.0 - 16.0 g/dL    Hematocrit 42.6 36.0 - 46.0 %    MCV 93 80 - 100 fL    MCH 31.0 26.0 - 34.0 pg    MCHC 33.3 32.0 - 36.0 g/dL    RDW 13.2 11.5 - 14.5 %    Platelets 274 150 - 450 x10*3/uL   Basic Metabolic Panel    Collection Time: 11/01/24  3:47 PM   Result Value Ref Range    Glucose 87 74 - 99 mg/dL    Sodium 139 136 - 145 mmol/L    Potassium 4.2 3.5 - 5.3 mmol/L    Chloride 102 98 - 107 mmol/L    Bicarbonate 28 21 - 32 mmol/L    Anion Gap 13 10 - 20 mmol/L    Urea Nitrogen 11 6 - 23 mg/dL    Creatinine 0.69 0.50 - 1.05 mg/dL    eGFR >90 >60 mL/min/1.73m*2    Calcium 9.0 8.6 - 10.6 mg/dL   Type And Screen    Collection Time: 11/01/24  3:47 PM   Result Value Ref Range    ABO TYPE A     Rh TYPE POS     ANTIBODY SCREEN NEG    Coagulation Screen    Collection Time: 11/01/24  3:47 PM   Result Value Ref Range    Protime 10.8 9.8 - 12.8 seconds    INR 1.0 0.9 - 1.1    aPTT 30 27 - 38 seconds

## 2024-11-03 LAB — STAPHYLOCOCCUS SPEC CULT: NORMAL

## 2024-11-12 ENCOUNTER — TELEPHONE (OUTPATIENT)
Dept: OTOLARYNGOLOGY | Facility: CLINIC | Age: 49
End: 2024-11-12
Payer: COMMERCIAL

## 2024-11-12 NOTE — TELEPHONE ENCOUNTER
Spoke with patient regarding surgical questions. All questions answered to patient satisfaction. Patient will call if there is additional questions or concerns prior to surgery.

## 2024-11-15 ENCOUNTER — ANESTHESIA EVENT (OUTPATIENT)
Dept: OPERATING ROOM | Facility: HOSPITAL | Age: 49
End: 2024-11-15
Payer: COMMERCIAL

## 2024-11-15 NOTE — PROGRESS NOTES
Pharmacy Medication History Review    Juana Motley is a 49 y.o. female who is planned to be admitted for Conductive hearing loss of right ear with unrestricted hearing of left ear. Pharmacy called the patient prior to their scheduled procedure and reviewed the patient's [id number] medications for accuracy.    Medications ADDED:  none  Medications CHANGED:  Atomoxetine 18mg to atomoxetine 18mg  Clonazepam 1mg to clonazepam ODT 0.5mg  Medications REMOVED:   Ibuprofen 800mg    Please review updated prior to admission medication list and comments regarding how patient may be taking medications differently by going to Admission tab --> Admission Orders --> Admit Orders / Review prior to admission medications.     Preferred pharmacy, last doses of medications, and allergies to be confirmed with patient by nursing the day of procedure.     Sources used to complete the med history include spoke with patient, medication dispense report, oarrs, care everywhere    Below are additional concerns with the patient's PTA list.  Patient states they only take the atomoxetine 25mg at this time. L.F. 10/14/24 #90/90d. (18mg was L.F. 09/27/24 #90/90d) patient takes in the AM  Patient states they take clonazepam ODT 0.5mg PRN. There is no fill history of medication and there is no OARRS history in the last year of the medication.   Patient states they take their synthroid at bedtime and directions state #1 tablet daily x 6 days a week and 1/2 tablet 1 day a week. Patient states they take the half tablet on sundays  Patient states they are not using mounjaro 5mg-0.5ml (L.F. 08/01/24 #3/84d)    Cristela Nunn    [facility]  Please reach out via Secure Chat for questions

## 2024-11-16 NOTE — ANESTHESIA PREPROCEDURE EVALUATION
Patient: Juana Motley    Procedure Information       Date/Time: 11/18/24 0700    Procedures:       RIGHT SIDE MIDDLE CRANIAL FOSSA APPROACH FOR ENCEPHALOCELE REPAIR, TEGEMEN DEFECT REPAIR, AND POSSIBLE LUMBAR DRAIN FOR CSF LEAK (Right)      RIGHT SIDE MIDDLE CRANIAL FOSSA APPROACH FOR ENCEPHALOCELE REPAIR, TEGEMEN DEFECT REPAIR, AND POSSIBLE LUMBAR DRAIN FOR CSF LEAK (Right)    Location: [facility] OR 26 / [facility]    Surgeons: Sridhar Pedro MD; Cristela Tao MD            Relevant Problems   Cardiac   (+) Atherosclerosis of coronary artery   (+) Essential hypertension   (+) Mixed hyperlipidemia   (+) Sinus bradycardia      Neuro   (+) Cervical radiculopathy   (+) Depressive disorder   (+) Low back pain with sciatica      Endocrine   (+) Hypothyroidism   (+) Obesity   (+) Type 2 diabetes mellitus      Musculoskeletal   (+) Spondylosis of cervical spine without myelopathy      HEENT   (+) Conductive hearing loss of right ear with unrestricted hearing of left ear   (+) Conductive hearing loss, unilateral, right ear, with unrestricted hearing on the contralateral side   (+) Sinusitis      ID   (+) Chronic tubotympanic suppurative otitis media of right ear       Clinical information reviewed:   Tobacco  Allergies  Meds   Med Hx  Surg Hx  OB Status  Fam Hx  Soc   Hx        NPO Detail:  NPO/Void Status  Date of Last Liquid: 11/18/24  Time of Last Liquid: 0300  Date of Last Solid: 11/17/24  Time of Last Solid: 1800  Last Intake Type: -- (gum around 0430 today)         Physical Exam    Airway  Mallampati: II  TM distance: >3 FB  Neck ROM: full     Cardiovascular   Rhythm: regular     Dental    Pulmonary   Breath sounds clear to auscultation     Abdominal   (+) obese           Anesthesia Plan    History of general anesthesia?: yes  History of complications of general anesthesia?: no    ASA 3     general   (Plan GA by MONY, art line. )  The patient is a current smoker.    intravenous  induction   Postoperative administration of opioids is intended.  Trial extubation is planned.  Anesthetic plan and risks discussed with patient and spouse.  Use of blood products discussed with patient and spouse who consented to blood products.    Plan discussed with resident and attending.

## 2024-11-18 ENCOUNTER — APPOINTMENT (OUTPATIENT)
Dept: RADIOLOGY | Facility: HOSPITAL | Age: 49
DRG: 026 | End: 2024-11-18
Payer: COMMERCIAL

## 2024-11-18 ENCOUNTER — HOSPITAL ENCOUNTER (INPATIENT)
Facility: HOSPITAL | Age: 49
LOS: 5 days | Discharge: HOME | DRG: 026 | End: 2024-11-23
Attending: NEUROLOGICAL SURGERY | Admitting: NEUROLOGICAL SURGERY
Payer: COMMERCIAL

## 2024-11-18 ENCOUNTER — ANESTHESIA (OUTPATIENT)
Dept: OPERATING ROOM | Facility: HOSPITAL | Age: 49
End: 2024-11-18
Payer: COMMERCIAL

## 2024-11-18 DIAGNOSIS — H90.11 CONDUCTIVE HEARING LOSS OF RIGHT EAR WITH UNRESTRICTED HEARING OF LEFT EAR: ICD-10-CM

## 2024-11-18 DIAGNOSIS — Q01.9 ENCEPHALOCELE: Primary | ICD-10-CM

## 2024-11-18 DIAGNOSIS — G89.18 ACUTE POSTOPERATIVE PAIN: ICD-10-CM

## 2024-11-18 DIAGNOSIS — Z86.61 HISTORY OF BACTERIAL MENINGITIS: ICD-10-CM

## 2024-11-18 LAB
ABO GROUP (TYPE) IN BLOOD: NORMAL
ANION GAP BLDA CALCULATED.4IONS-SCNC: 4 MMO/L (ref 10–25)
ANION GAP BLDA CALCULATED.4IONS-SCNC: 9 MMO/L (ref 10–25)
BASE EXCESS BLDA CALC-SCNC: 1.4 MMOL/L (ref -2–3)
BASE EXCESS BLDA CALC-SCNC: 2.5 MMOL/L (ref -2–3)
BODY TEMPERATURE: 37 DEGREES CELSIUS
BODY TEMPERATURE: 37 DEGREES CELSIUS
CA-I BLDA-SCNC: 1.19 MMOL/L (ref 1.1–1.33)
CA-I BLDA-SCNC: 1.19 MMOL/L (ref 1.1–1.33)
CHLORIDE BLDA-SCNC: 103 MMOL/L (ref 98–107)
CHLORIDE BLDA-SCNC: 106 MMOL/L (ref 98–107)
GLUCOSE BLD MANUAL STRIP-MCNC: 110 MG/DL (ref 74–99)
GLUCOSE BLD MANUAL STRIP-MCNC: 125 MG/DL (ref 74–99)
GLUCOSE BLD MANUAL STRIP-MCNC: 137 MG/DL (ref 74–99)
GLUCOSE BLDA-MCNC: 116 MG/DL (ref 74–99)
GLUCOSE BLDA-MCNC: 172 MG/DL (ref 74–99)
HCO3 BLDA-SCNC: 24.9 MMOL/L (ref 22–26)
HCO3 BLDA-SCNC: 26.3 MMOL/L (ref 22–26)
HCT VFR BLD EST: 40 % (ref 36–46)
HCT VFR BLD EST: 41 % (ref 36–46)
HGB BLDA-MCNC: 13.3 G/DL (ref 12–16)
HGB BLDA-MCNC: 13.6 G/DL (ref 12–16)
INHALED O2 CONCENTRATION: 50 %
INHALED O2 CONCENTRATION: 50 %
LACTATE BLDA-SCNC: 0.9 MMOL/L (ref 0.4–2)
LACTATE BLDA-SCNC: 1.1 MMOL/L (ref 0.4–2)
OXYHGB MFR BLDA: 97.6 % (ref 94–98)
OXYHGB MFR BLDA: 97.6 % (ref 94–98)
PCO2 BLDA: 35 MM HG (ref 38–42)
PCO2 BLDA: 37 MM HG (ref 38–42)
PH BLDA: 7.46 PH (ref 7.38–7.42)
PH BLDA: 7.46 PH (ref 7.38–7.42)
PO2 BLDA: 156 MM HG (ref 85–95)
PO2 BLDA: 184 MM HG (ref 85–95)
POTASSIUM BLDA-SCNC: 3.7 MMOL/L (ref 3.5–5.3)
POTASSIUM BLDA-SCNC: 3.8 MMOL/L (ref 3.5–5.3)
PREGNANCY TEST URINE, POC: NEGATIVE
RH FACTOR (ANTIGEN D): NORMAL
SAO2 % BLDA: 100 % (ref 94–100)
SAO2 % BLDA: 100 % (ref 94–100)
SODIUM BLDA-SCNC: 132 MMOL/L (ref 136–145)
SODIUM BLDA-SCNC: 133 MMOL/L (ref 136–145)

## 2024-11-18 PROCEDURE — 3600000005 HC OR TIME - INITIAL BASE CHARGE - PROCEDURE LEVEL FIVE: Performed by: NEUROLOGICAL SURGERY

## 2024-11-18 PROCEDURE — 69990 MICROSURGERY ADD-ON: CPT | Performed by: NEUROLOGICAL SURGERY

## 2024-11-18 PROCEDURE — C1763 CONN TISS, NON-HUMAN: HCPCS | Performed by: NEUROLOGICAL SURGERY

## 2024-11-18 PROCEDURE — 36620 INSERTION CATHETER ARTERY: CPT

## 2024-11-18 PROCEDURE — 2500000001 HC RX 250 WO HCPCS SELF ADMINISTERED DRUGS (ALT 637 FOR MEDICARE OP): Performed by: STUDENT IN AN ORGANIZED HEALTH CARE EDUCATION/TRAINING PROGRAM

## 2024-11-18 PROCEDURE — 2500000005 HC RX 250 GENERAL PHARMACY W/O HCPCS

## 2024-11-18 PROCEDURE — 2500000004 HC RX 250 GENERAL PHARMACY W/ HCPCS (ALT 636 FOR OP/ED)

## 2024-11-18 PROCEDURE — 00U207Z SUPPLEMENT DURA MATER WITH AUTOLOGOUS TISSUE SUBSTITUTE, OPEN APPROACH: ICD-10-PCS | Performed by: NEUROLOGICAL SURGERY

## 2024-11-18 PROCEDURE — 09B50ZZ EXCISION OF RIGHT MIDDLE EAR, OPEN APPROACH: ICD-10-PCS | Performed by: NEUROLOGICAL SURGERY

## 2024-11-18 PROCEDURE — 2500000004 HC RX 250 GENERAL PHARMACY W/ HCPCS (ALT 636 FOR OP/ED): Performed by: OTOLARYNGOLOGY

## 2024-11-18 PROCEDURE — 62272 THER SPI PNXR DRG CSF: CPT | Performed by: NEUROLOGICAL SURGERY

## 2024-11-18 PROCEDURE — 82947 ASSAY GLUCOSE BLOOD QUANT: CPT

## 2024-11-18 PROCEDURE — 0JB10ZZ EXCISION OF FACE SUBCUTANEOUS TISSUE AND FASCIA, OPEN APPROACH: ICD-10-PCS | Performed by: NEUROLOGICAL SURGERY

## 2024-11-18 PROCEDURE — 2780000003 HC OR 278 NO HCPCS: Performed by: NEUROLOGICAL SURGERY

## 2024-11-18 PROCEDURE — 2500000002 HC RX 250 W HCPCS SELF ADMINISTERED DRUGS (ALT 637 FOR MEDICARE OP, ALT 636 FOR OP/ED): Performed by: STUDENT IN AN ORGANIZED HEALTH CARE EDUCATION/TRAINING PROGRAM

## 2024-11-18 PROCEDURE — 88342 IMHCHEM/IMCYTCHM 1ST ANTB: CPT | Performed by: PATHOLOGY

## 2024-11-18 PROCEDURE — 2500000004 HC RX 250 GENERAL PHARMACY W/ HCPCS (ALT 636 FOR OP/ED): Performed by: STUDENT IN AN ORGANIZED HEALTH CARE EDUCATION/TRAINING PROGRAM

## 2024-11-18 PROCEDURE — C1713 ANCHOR/SCREW BN/BN,TIS/BN: HCPCS | Performed by: NEUROLOGICAL SURGERY

## 2024-11-18 PROCEDURE — 61591 INFRATEMPORAL APPROACH/SKULL: CPT | Performed by: OTOLARYNGOLOGY

## 2024-11-18 PROCEDURE — 88305 TISSUE EXAM BY PATHOLOGIST: CPT | Mod: TC,SUR | Performed by: OTOLARYNGOLOGY

## 2024-11-18 PROCEDURE — 61591 INFRATEMPORAL APPROACH/SKULL: CPT | Performed by: NEUROLOGICAL SURGERY

## 2024-11-18 PROCEDURE — 88341 IMHCHEM/IMCYTCHM EA ADD ANTB: CPT | Performed by: PATHOLOGY

## 2024-11-18 PROCEDURE — 70450 CT HEAD/BRAIN W/O DYE: CPT

## 2024-11-18 PROCEDURE — 36415 COLL VENOUS BLD VENIPUNCTURE: CPT

## 2024-11-18 PROCEDURE — 2500000001 HC RX 250 WO HCPCS SELF ADMINISTERED DRUGS (ALT 637 FOR MEDICARE OP)

## 2024-11-18 PROCEDURE — 3700000002 HC GENERAL ANESTHESIA TIME - EACH INCREMENTAL 1 MINUTE: Performed by: NEUROLOGICAL SURGERY

## 2024-11-18 PROCEDURE — 0NB00ZZ EXCISION OF SKULL, OPEN APPROACH: ICD-10-PCS | Performed by: NEUROLOGICAL SURGERY

## 2024-11-18 PROCEDURE — 70450 CT HEAD/BRAIN W/O DYE: CPT | Performed by: RADIOLOGY

## 2024-11-18 PROCEDURE — 00U20JZ SUPPLEMENT DURA MATER WITH SYNTHETIC SUBSTITUTE, OPEN APPROACH: ICD-10-PCS | Performed by: NEUROLOGICAL SURGERY

## 2024-11-18 PROCEDURE — 2020000001 HC ICU ROOM DAILY

## 2024-11-18 PROCEDURE — 81025 URINE PREGNANCY TEST: CPT | Performed by: NURSE PRACTITIONER

## 2024-11-18 PROCEDURE — A62121 PR REPAIR ENCEPHALOCELE,BASE: Performed by: ANESTHESIOLOGY

## 2024-11-18 PROCEDURE — 3600000010 HC OR TIME - EACH INCREMENTAL 1 MINUTE - PROCEDURE LEVEL FIVE: Performed by: NEUROLOGICAL SURGERY

## 2024-11-18 PROCEDURE — 2720000007 HC OR 272 NO HCPCS: Performed by: NEUROLOGICAL SURGERY

## 2024-11-18 PROCEDURE — 84132 ASSAY OF SERUM POTASSIUM: CPT

## 2024-11-18 PROCEDURE — 88305 TISSUE EXAM BY PATHOLOGIST: CPT | Performed by: PATHOLOGY

## 2024-11-18 PROCEDURE — 3700000001 HC GENERAL ANESTHESIA TIME - INITIAL BASE CHARGE: Performed by: NEUROLOGICAL SURGERY

## 2024-11-18 DEVICE — CROSS PIN, AXS SELF-TAP, 1.5 X 4MM: Type: IMPLANTABLE DEVICE | Site: CRANIAL | Status: FUNCTIONAL

## 2024-11-18 DEVICE — DURAGEN® PLUS DURAL REGENERATION MATRIX, 2 IN X 2 IN (5 CM X 5 CM)
Type: IMPLANTABLE DEVICE | Site: BRAIN | Status: FUNCTIONAL
Brand: DURAGEN® PLUS

## 2024-11-18 DEVICE — IMPLANTABLE DEVICE: Type: IMPLANTABLE DEVICE | Site: CRANIAL | Status: FUNCTIONAL

## 2024-11-18 RX ORDER — ONDANSETRON HYDROCHLORIDE 2 MG/ML
4 INJECTION, SOLUTION INTRAVENOUS ONCE AS NEEDED
Status: DISCONTINUED | OUTPATIENT
Start: 2024-11-18 | End: 2024-11-18

## 2024-11-18 RX ORDER — HYDROMORPHONE HYDROCHLORIDE 1 MG/ML
0.5 INJECTION, SOLUTION INTRAMUSCULAR; INTRAVENOUS; SUBCUTANEOUS EVERY 5 MIN PRN
Status: DISCONTINUED | OUTPATIENT
Start: 2024-11-18 | End: 2024-11-18

## 2024-11-18 RX ORDER — HYDRALAZINE HYDROCHLORIDE 20 MG/ML
10 INJECTION INTRAMUSCULAR; INTRAVENOUS
Status: DISCONTINUED | OUTPATIENT
Start: 2024-11-18 | End: 2024-11-19

## 2024-11-18 RX ORDER — NALOXONE HYDROCHLORIDE 0.4 MG/ML
0.2 INJECTION, SOLUTION INTRAMUSCULAR; INTRAVENOUS; SUBCUTANEOUS EVERY 5 MIN PRN
Status: DISCONTINUED | OUTPATIENT
Start: 2024-11-18 | End: 2024-11-23 | Stop reason: HOSPADM

## 2024-11-18 RX ORDER — LABETALOL HYDROCHLORIDE 5 MG/ML
5 INJECTION, SOLUTION INTRAVENOUS ONCE AS NEEDED
Status: DISCONTINUED | OUTPATIENT
Start: 2024-11-18 | End: 2024-11-18

## 2024-11-18 RX ORDER — HYDRALAZINE HYDROCHLORIDE 20 MG/ML
INJECTION INTRAMUSCULAR; INTRAVENOUS AS NEEDED
Status: DISCONTINUED | OUTPATIENT
Start: 2024-11-18 | End: 2024-11-18

## 2024-11-18 RX ORDER — DEXTROSE 50 % IN WATER (D50W) INTRAVENOUS SYRINGE
25
Status: DISCONTINUED | OUTPATIENT
Start: 2024-11-18 | End: 2024-11-23 | Stop reason: HOSPADM

## 2024-11-18 RX ORDER — LIDOCAINE HYDROCHLORIDE AND EPINEPHRINE 10; 10 UG/ML; MG/ML
INJECTION, SOLUTION INFILTRATION; PERINEURAL AS NEEDED
Status: DISCONTINUED | OUTPATIENT
Start: 2024-11-18 | End: 2024-11-18 | Stop reason: HOSPADM

## 2024-11-18 RX ORDER — MANNITOL 20 G/100ML
INJECTION, SOLUTION INTRAVENOUS AS NEEDED
Status: DISCONTINUED | OUTPATIENT
Start: 2024-11-18 | End: 2024-11-18

## 2024-11-18 RX ORDER — ATOMOXETINE 25 MG/1
25 CAPSULE ORAL EVERY MORNING
Status: DISCONTINUED | OUTPATIENT
Start: 2024-11-18 | End: 2024-11-23 | Stop reason: HOSPADM

## 2024-11-18 RX ORDER — PROPOFOL 10 MG/ML
INJECTION, EMULSION INTRAVENOUS AS NEEDED
Status: DISCONTINUED | OUTPATIENT
Start: 2024-11-18 | End: 2024-11-18

## 2024-11-18 RX ORDER — HYDRALAZINE HYDROCHLORIDE 20 MG/ML
5 INJECTION INTRAMUSCULAR; INTRAVENOUS EVERY 30 MIN PRN
Status: DISCONTINUED | OUTPATIENT
Start: 2024-11-18 | End: 2024-11-18

## 2024-11-18 RX ORDER — HYDROMORPHONE HYDROCHLORIDE 1 MG/ML
0.5 INJECTION, SOLUTION INTRAMUSCULAR; INTRAVENOUS; SUBCUTANEOUS EVERY 4 HOURS PRN
Status: DISCONTINUED | OUTPATIENT
Start: 2024-11-18 | End: 2024-11-19

## 2024-11-18 RX ORDER — OXYCODONE HYDROCHLORIDE 5 MG/1
5 TABLET ORAL EVERY 4 HOURS PRN
Status: DISCONTINUED | OUTPATIENT
Start: 2024-11-18 | End: 2024-11-19

## 2024-11-18 RX ORDER — AMOXICILLIN 250 MG
2 CAPSULE ORAL 2 TIMES DAILY
Status: DISCONTINUED | OUTPATIENT
Start: 2024-11-18 | End: 2024-11-23 | Stop reason: HOSPADM

## 2024-11-18 RX ORDER — HYDROMORPHONE HYDROCHLORIDE 1 MG/ML
INJECTION, SOLUTION INTRAMUSCULAR; INTRAVENOUS; SUBCUTANEOUS AS NEEDED
Status: DISCONTINUED | OUTPATIENT
Start: 2024-11-18 | End: 2024-11-18

## 2024-11-18 RX ORDER — ROSUVASTATIN CALCIUM 10 MG/1
5 TABLET, COATED ORAL DAILY
Status: DISCONTINUED | OUTPATIENT
Start: 2024-11-18 | End: 2024-11-23 | Stop reason: HOSPADM

## 2024-11-18 RX ORDER — FENTANYL CITRATE 50 UG/ML
INJECTION, SOLUTION INTRAMUSCULAR; INTRAVENOUS AS NEEDED
Status: DISCONTINUED | OUTPATIENT
Start: 2024-11-18 | End: 2024-11-18

## 2024-11-18 RX ORDER — OXYCODONE HYDROCHLORIDE 5 MG/1
10 TABLET ORAL EVERY 4 HOURS PRN
Status: DISCONTINUED | OUTPATIENT
Start: 2024-11-18 | End: 2024-11-23 | Stop reason: HOSPADM

## 2024-11-18 RX ORDER — POTASSIUM CHLORIDE 750 MG/1
10 TABLET, FILM COATED, EXTENDED RELEASE ORAL DAILY
Status: DISCONTINUED | OUTPATIENT
Start: 2024-11-18 | End: 2024-11-19

## 2024-11-18 RX ORDER — CEFAZOLIN 1 G/1
INJECTION, POWDER, FOR SOLUTION INTRAVENOUS AS NEEDED
Status: DISCONTINUED | OUTPATIENT
Start: 2024-11-18 | End: 2024-11-18

## 2024-11-18 RX ORDER — SODIUM CHLORIDE 9 MG/ML
75 INJECTION, SOLUTION INTRAVENOUS CONTINUOUS
Status: DISCONTINUED | OUTPATIENT
Start: 2024-11-18 | End: 2024-11-19

## 2024-11-18 RX ORDER — LIDOCAINE HYDROCHLORIDE 10 MG/ML
0.1 INJECTION, SOLUTION INFILTRATION; PERINEURAL ONCE
Status: DISCONTINUED | OUTPATIENT
Start: 2024-11-18 | End: 2024-11-18

## 2024-11-18 RX ORDER — HEPARIN SODIUM 5000 [USP'U]/ML
5000 INJECTION, SOLUTION INTRAVENOUS; SUBCUTANEOUS EVERY 8 HOURS
Status: DISCONTINUED | OUTPATIENT
Start: 2024-11-20 | End: 2024-11-23 | Stop reason: HOSPADM

## 2024-11-18 RX ORDER — DEXTROSE 50 % IN WATER (D50W) INTRAVENOUS SYRINGE
12.5
Status: DISCONTINUED | OUTPATIENT
Start: 2024-11-18 | End: 2024-11-23 | Stop reason: HOSPADM

## 2024-11-18 RX ORDER — OXYCODONE HYDROCHLORIDE 5 MG/1
5 TABLET ORAL EVERY 4 HOURS PRN
Status: DISCONTINUED | OUTPATIENT
Start: 2024-11-18 | End: 2024-11-18

## 2024-11-18 RX ORDER — ONDANSETRON 4 MG/1
4 TABLET, ORALLY DISINTEGRATING ORAL EVERY 8 HOURS PRN
Status: DISCONTINUED | OUTPATIENT
Start: 2024-11-18 | End: 2024-11-23 | Stop reason: HOSPADM

## 2024-11-18 RX ORDER — ONDANSETRON HYDROCHLORIDE 2 MG/ML
INJECTION, SOLUTION INTRAVENOUS AS NEEDED
Status: DISCONTINUED | OUTPATIENT
Start: 2024-11-18 | End: 2024-11-18

## 2024-11-18 RX ORDER — SPIRONOLACTONE 25 MG/1
25 TABLET ORAL DAILY
Status: DISCONTINUED | OUTPATIENT
Start: 2024-11-18 | End: 2024-11-23 | Stop reason: HOSPADM

## 2024-11-18 RX ORDER — OXYCODONE HYDROCHLORIDE 5 MG/1
5 TABLET ORAL EVERY 4 HOURS PRN
Status: DISCONTINUED | OUTPATIENT
Start: 2024-11-18 | End: 2024-11-23 | Stop reason: HOSPADM

## 2024-11-18 RX ORDER — VANCOMYCIN HYDROCHLORIDE 1 G/20ML
INJECTION, POWDER, LYOPHILIZED, FOR SOLUTION INTRAVENOUS AS NEEDED
Status: DISCONTINUED | OUTPATIENT
Start: 2024-11-18 | End: 2024-11-18

## 2024-11-18 RX ORDER — LABETALOL HYDROCHLORIDE 5 MG/ML
10 INJECTION, SOLUTION INTRAVENOUS EVERY 10 MIN PRN
Status: DISCONTINUED | OUTPATIENT
Start: 2024-11-18 | End: 2024-11-19

## 2024-11-18 RX ORDER — CEFTRIAXONE 2 G/50ML
2 INJECTION, SOLUTION INTRAVENOUS EVERY 12 HOURS
Status: DISCONTINUED | OUTPATIENT
Start: 2024-11-18 | End: 2024-11-23 | Stop reason: HOSPADM

## 2024-11-18 RX ORDER — CYCLOBENZAPRINE HCL 10 MG
10 TABLET ORAL 3 TIMES DAILY
Status: DISCONTINUED | OUTPATIENT
Start: 2024-11-18 | End: 2024-11-19

## 2024-11-18 RX ORDER — CEFAZOLIN SODIUM 2 G/100ML
2 INJECTION, SOLUTION INTRAVENOUS EVERY 8 HOURS
Status: DISCONTINUED | OUTPATIENT
Start: 2024-11-18 | End: 2024-11-18

## 2024-11-18 RX ORDER — VANCOMYCIN/0.9 % SOD CHLORIDE 1.5G/250ML
1500 PLASTIC BAG, INJECTION (ML) INTRAVENOUS EVERY 12 HOURS
Status: DISCONTINUED | OUTPATIENT
Start: 2024-11-18 | End: 2024-11-23 | Stop reason: HOSPADM

## 2024-11-18 RX ORDER — CLONAZEPAM 0.5 MG/1
0.5 TABLET, ORALLY DISINTEGRATING ORAL 2 TIMES DAILY PRN
Status: DISCONTINUED | OUTPATIENT
Start: 2024-11-18 | End: 2024-11-23 | Stop reason: HOSPADM

## 2024-11-18 RX ORDER — LEVOTHYROXINE SODIUM 100 UG/1
100 TABLET ORAL NIGHTLY
Status: DISCONTINUED | OUTPATIENT
Start: 2024-11-18 | End: 2024-11-23 | Stop reason: HOSPADM

## 2024-11-18 RX ORDER — ROCURONIUM BROMIDE 10 MG/ML
INJECTION, SOLUTION INTRAVENOUS AS NEEDED
Status: DISCONTINUED | OUTPATIENT
Start: 2024-11-18 | End: 2024-11-18

## 2024-11-18 RX ORDER — PHENYLEPHRINE 10 MG/250 ML(40 MCG/ML)IN 0.9 % SOD.CHLORIDE INTRAVENOUS
CONTINUOUS PRN
Status: DISCONTINUED | OUTPATIENT
Start: 2024-11-18 | End: 2024-11-18

## 2024-11-18 RX ORDER — LIDOCAINE HYDROCHLORIDE 20 MG/ML
INJECTION, SOLUTION INFILTRATION; PERINEURAL AS NEEDED
Status: DISCONTINUED | OUTPATIENT
Start: 2024-11-18 | End: 2024-11-18

## 2024-11-18 RX ORDER — LABETALOL HYDROCHLORIDE 5 MG/ML
INJECTION, SOLUTION INTRAVENOUS AS NEEDED
Status: DISCONTINUED | OUTPATIENT
Start: 2024-11-18 | End: 2024-11-18

## 2024-11-18 RX ORDER — FLUVOXAMINE MALEATE 50 MG/1
50 TABLET, FILM COATED ORAL NIGHTLY
Status: DISCONTINUED | OUTPATIENT
Start: 2024-11-18 | End: 2024-11-23 | Stop reason: HOSPADM

## 2024-11-18 RX ORDER — OXYCODONE HYDROCHLORIDE 5 MG/1
10 TABLET ORAL EVERY 4 HOURS PRN
Status: DISCONTINUED | OUTPATIENT
Start: 2024-11-18 | End: 2024-11-18

## 2024-11-18 RX ORDER — GLYCOPYRROLATE 0.2 MG/ML
INJECTION INTRAMUSCULAR; INTRAVENOUS AS NEEDED
Status: DISCONTINUED | OUTPATIENT
Start: 2024-11-18 | End: 2024-11-18

## 2024-11-18 RX ORDER — NORETHINDRONE AND ETHINYL ESTRADIOL 0.5-0.035
KIT ORAL AS NEEDED
Status: DISCONTINUED | OUTPATIENT
Start: 2024-11-18 | End: 2024-11-18

## 2024-11-18 RX ORDER — CEFTRIAXONE 1 G/1
INJECTION, POWDER, FOR SOLUTION INTRAMUSCULAR; INTRAVENOUS AS NEEDED
Status: DISCONTINUED | OUTPATIENT
Start: 2024-11-18 | End: 2024-11-18

## 2024-11-18 RX ORDER — SODIUM CHLORIDE, SODIUM LACTATE, POTASSIUM CHLORIDE, CALCIUM CHLORIDE 600; 310; 30; 20 MG/100ML; MG/100ML; MG/100ML; MG/100ML
100 INJECTION, SOLUTION INTRAVENOUS CONTINUOUS
Status: DISCONTINUED | OUTPATIENT
Start: 2024-11-18 | End: 2024-11-18

## 2024-11-18 RX ORDER — MIDAZOLAM HYDROCHLORIDE 1 MG/ML
INJECTION INTRAMUSCULAR; INTRAVENOUS AS NEEDED
Status: DISCONTINUED | OUTPATIENT
Start: 2024-11-18 | End: 2024-11-18

## 2024-11-18 RX ORDER — ONDANSETRON HYDROCHLORIDE 2 MG/ML
4 INJECTION, SOLUTION INTRAVENOUS EVERY 8 HOURS PRN
Status: DISCONTINUED | OUTPATIENT
Start: 2024-11-18 | End: 2024-11-23 | Stop reason: HOSPADM

## 2024-11-18 RX ORDER — SODIUM CHLORIDE, SODIUM LACTATE, POTASSIUM CHLORIDE, CALCIUM CHLORIDE 600; 310; 30; 20 MG/100ML; MG/100ML; MG/100ML; MG/100ML
INJECTION, SOLUTION INTRAVENOUS CONTINUOUS PRN
Status: DISCONTINUED | OUTPATIENT
Start: 2024-11-18 | End: 2024-11-18

## 2024-11-18 RX ORDER — HYDROMORPHONE HYDROCHLORIDE 0.2 MG/ML
0.2 INJECTION INTRAMUSCULAR; INTRAVENOUS; SUBCUTANEOUS EVERY 5 MIN PRN
Status: DISCONTINUED | OUTPATIENT
Start: 2024-11-18 | End: 2024-11-18

## 2024-11-18 RX ORDER — ACETAMINOPHEN 325 MG/1
TABLET ORAL AS NEEDED
Status: DISCONTINUED | OUTPATIENT
Start: 2024-11-18 | End: 2024-11-18

## 2024-11-18 RX ORDER — NEOSTIGMINE METHYLSULFATE 1 MG/ML
INJECTION INTRAVENOUS AS NEEDED
Status: DISCONTINUED | OUTPATIENT
Start: 2024-11-18 | End: 2024-11-18

## 2024-11-18 RX ORDER — VANCOMYCIN HYDROCHLORIDE 1 G/20ML
INJECTION, POWDER, LYOPHILIZED, FOR SOLUTION INTRAVENOUS DAILY PRN
Status: DISCONTINUED | OUTPATIENT
Start: 2024-11-18 | End: 2024-11-23 | Stop reason: HOSPADM

## 2024-11-18 RX ORDER — PHENYLEPHRINE HCL IN 0.9% NACL 0.4MG/10ML
SYRINGE (ML) INTRAVENOUS AS NEEDED
Status: DISCONTINUED | OUTPATIENT
Start: 2024-11-18 | End: 2024-11-18

## 2024-11-18 RX ORDER — ALBUTEROL SULFATE 0.83 MG/ML
2.5 SOLUTION RESPIRATORY (INHALATION) ONCE AS NEEDED
Status: DISCONTINUED | OUTPATIENT
Start: 2024-11-18 | End: 2024-11-18

## 2024-11-18 RX ORDER — PANTOPRAZOLE SODIUM 40 MG/10ML
40 INJECTION, POWDER, LYOPHILIZED, FOR SOLUTION INTRAVENOUS
Status: DISCONTINUED | OUTPATIENT
Start: 2024-11-19 | End: 2024-11-23 | Stop reason: HOSPADM

## 2024-11-18 RX ORDER — HYDROCHLOROTHIAZIDE 25 MG/1
12.5 TABLET ORAL DAILY
Status: DISCONTINUED | OUTPATIENT
Start: 2024-11-18 | End: 2024-11-23 | Stop reason: HOSPADM

## 2024-11-18 RX ORDER — PANTOPRAZOLE SODIUM 40 MG/1
40 TABLET, DELAYED RELEASE ORAL
Status: DISCONTINUED | OUTPATIENT
Start: 2024-11-19 | End: 2024-11-23 | Stop reason: HOSPADM

## 2024-11-18 RX ORDER — LEVETIRACETAM 500 MG/5ML
INJECTION, SOLUTION, CONCENTRATE INTRAVENOUS AS NEEDED
Status: DISCONTINUED | OUTPATIENT
Start: 2024-11-18 | End: 2024-11-18

## 2024-11-18 RX ORDER — ACETAMINOPHEN 325 MG/1
650 TABLET ORAL EVERY 6 HOURS
Status: DISCONTINUED | OUTPATIENT
Start: 2024-11-18 | End: 2024-11-23 | Stop reason: HOSPADM

## 2024-11-18 SDOH — SOCIAL STABILITY: SOCIAL INSECURITY: WITHIN THE LAST YEAR, HAVE YOU BEEN HUMILIATED OR EMOTIONALLY ABUSED IN OTHER WAYS BY YOUR PARTNER OR EX-PARTNER?: NO

## 2024-11-18 SDOH — SOCIAL STABILITY: SOCIAL INSECURITY: DO YOU FEEL UNSAFE GOING BACK TO THE PLACE WHERE YOU ARE LIVING?: NO

## 2024-11-18 SDOH — SOCIAL STABILITY: SOCIAL INSECURITY: WITHIN THE LAST YEAR, HAVE YOU BEEN AFRAID OF YOUR PARTNER OR EX-PARTNER?: NO

## 2024-11-18 SDOH — SOCIAL STABILITY: SOCIAL INSECURITY
WITHIN THE LAST YEAR, HAVE YOU BEEN KICKED, HIT, SLAPPED, OR OTHERWISE PHYSICALLY HURT BY YOUR PARTNER OR EX-PARTNER?: NO

## 2024-11-18 SDOH — ECONOMIC STABILITY: INCOME INSECURITY: IN THE PAST 12 MONTHS HAS THE ELECTRIC, GAS, OIL, OR WATER COMPANY THREATENED TO SHUT OFF SERVICES IN YOUR HOME?: NO

## 2024-11-18 SDOH — SOCIAL STABILITY: SOCIAL INSECURITY: HAS ANYONE EVER THREATENED TO HURT YOUR FAMILY OR YOUR PETS?: NO

## 2024-11-18 SDOH — SOCIAL STABILITY: SOCIAL INSECURITY
WITHIN THE LAST YEAR, HAVE YOU BEEN RAPED OR FORCED TO HAVE ANY KIND OF SEXUAL ACTIVITY BY YOUR PARTNER OR EX-PARTNER?: NO

## 2024-11-18 SDOH — SOCIAL STABILITY: SOCIAL INSECURITY: DO YOU FEEL ANYONE HAS EXPLOITED OR TAKEN ADVANTAGE OF YOU FINANCIALLY OR OF YOUR PERSONAL PROPERTY?: NO

## 2024-11-18 SDOH — SOCIAL STABILITY: SOCIAL INSECURITY: HAVE YOU HAD ANY THOUGHTS OF HARMING ANYONE ELSE?: NO

## 2024-11-18 SDOH — SOCIAL STABILITY: SOCIAL INSECURITY: ABUSE: ADULT

## 2024-11-18 SDOH — HEALTH STABILITY: MENTAL HEALTH: CURRENT SMOKER: 1

## 2024-11-18 SDOH — SOCIAL STABILITY: SOCIAL INSECURITY: WERE YOU ABLE TO COMPLETE ALL THE BEHAVIORAL HEALTH SCREENINGS?: YES

## 2024-11-18 SDOH — SOCIAL STABILITY: SOCIAL INSECURITY: HAVE YOU HAD THOUGHTS OF HARMING ANYONE ELSE?: NO

## 2024-11-18 SDOH — SOCIAL STABILITY: SOCIAL INSECURITY: ARE YOU OR HAVE YOU BEEN THREATENED OR ABUSED PHYSICALLY, EMOTIONALLY, OR SEXUALLY BY ANYONE?: NO

## 2024-11-18 SDOH — SOCIAL STABILITY: SOCIAL INSECURITY: ARE THERE ANY APPARENT SIGNS OF INJURIES/BEHAVIORS THAT COULD BE RELATED TO ABUSE/NEGLECT?: NO

## 2024-11-18 SDOH — SOCIAL STABILITY: SOCIAL INSECURITY: DOES ANYONE TRY TO KEEP YOU FROM HAVING/CONTACTING OTHER FRIENDS OR DOING THINGS OUTSIDE YOUR HOME?: NO

## 2024-11-18 ASSESSMENT — COLUMBIA-SUICIDE SEVERITY RATING SCALE - C-SSRS
2. HAVE YOU ACTUALLY HAD ANY THOUGHTS OF KILLING YOURSELF?: NO
1. IN THE PAST MONTH, HAVE YOU WISHED YOU WERE DEAD OR WISHED YOU COULD GO TO SLEEP AND NOT WAKE UP?: NO
6. HAVE YOU EVER DONE ANYTHING, STARTED TO DO ANYTHING, OR PREPARED TO DO ANYTHING TO END YOUR LIFE?: NO

## 2024-11-18 ASSESSMENT — ACTIVITIES OF DAILY LIVING (ADL)
PATIENT'S MEMORY ADEQUATE TO SAFELY COMPLETE DAILY ACTIVITIES?: YES
ADEQUATE_TO_COMPLETE_ADL: YES
BATHING: INDEPENDENT
JUDGMENT_ADEQUATE_SAFELY_COMPLETE_DAILY_ACTIVITIES: YES
WALKS IN HOME: INDEPENDENT
LACK_OF_TRANSPORTATION: NO
HEARING - LEFT EAR: FUNCTIONAL
HEARING - RIGHT EAR: FUNCTIONAL
DRESSING YOURSELF: INDEPENDENT
FEEDING YOURSELF: INDEPENDENT
TOILETING: INDEPENDENT
GROOMING: INDEPENDENT

## 2024-11-18 ASSESSMENT — LIFESTYLE VARIABLES
HOW MANY STANDARD DRINKS CONTAINING ALCOHOL DO YOU HAVE ON A TYPICAL DAY: PATIENT DOES NOT DRINK
HOW OFTEN DO YOU HAVE A DRINK CONTAINING ALCOHOL: NEVER
AUDIT-C TOTAL SCORE: 0
SKIP TO QUESTIONS 9-10: 1
AUDIT-C TOTAL SCORE: 0
HOW OFTEN DO YOU HAVE 6 OR MORE DRINKS ON ONE OCCASION: NEVER

## 2024-11-18 ASSESSMENT — PATIENT HEALTH QUESTIONNAIRE - PHQ9
SUM OF ALL RESPONSES TO PHQ9 QUESTIONS 1 & 2: 0
2. FEELING DOWN, DEPRESSED OR HOPELESS: NOT AT ALL
1. LITTLE INTEREST OR PLEASURE IN DOING THINGS: NOT AT ALL

## 2024-11-18 ASSESSMENT — COGNITIVE AND FUNCTIONAL STATUS - GENERAL
PATIENT BASELINE BEDBOUND: NO
MOBILITY SCORE: 24
DAILY ACTIVITIY SCORE: 24

## 2024-11-18 ASSESSMENT — PAIN SCALES - GENERAL
PAINLEVEL_OUTOF10: 0 - NO PAIN
PAINLEVEL_OUTOF10: 8
PAINLEVEL_OUTOF10: 8
PAINLEVEL_OUTOF10: 10 - WORST POSSIBLE PAIN
PAIN_LEVEL: 0
PAINLEVEL_OUTOF10: 10 - WORST POSSIBLE PAIN
PAINLEVEL_OUTOF10: 6

## 2024-11-18 ASSESSMENT — ENCOUNTER SYMPTOMS: CONSTITUTIONAL NEGATIVE: 1

## 2024-11-18 ASSESSMENT — PAIN SCALES - PAIN ASSESSMENT IN ADVANCED DEMENTIA (PAINAD): TOTALSCORE: MEDICATION (SEE MAR)

## 2024-11-18 ASSESSMENT — PAIN DESCRIPTION - LOCATION
LOCATION: HEAD

## 2024-11-18 ASSESSMENT — PAIN - FUNCTIONAL ASSESSMENT
PAIN_FUNCTIONAL_ASSESSMENT: 0-10
PAIN_FUNCTIONAL_ASSESSMENT: 0-10

## 2024-11-18 ASSESSMENT — PAIN DESCRIPTION - DESCRIPTORS: DESCRIPTORS: PRESSURE;THROBBING

## 2024-11-18 NOTE — CONSULTS
Vancomycin Dosing by Pharmacy- INITIAL    Juana Motley is a 49 y.o. year old female who Pharmacy has been consulted for vancomycin dosing for CNS/meningoencephalitis. Based on the patient's indication and renal status this patient will be dosed based on a goal AUC of 500-600.     Renal function is currently unknown but no history of renal dysfunction mentioned in PMH.    Visit Vitals  /62   Pulse 71   Temp 36.6 °C (97.9 °F) (Temporal)   Resp 16        Lab Results   Component Value Date    CREATININE 0.69 2024        Patient weight is as follows:   Vitals:    24 1246   Weight: 91.5 kg (201 lb 11.5 oz)       Cultures:  No results found for the encounter in last 14 days.        No intake/output data recorded.  I/O during current shift:  I/O this shift:  In: 2093.8 [I.V.:1093.8; IV Piggyback:1000]  Out: 1145 [Urine:1145]    Temp (24hrs), Av.5 °C (97.7 °F), Min:36.4 °C (97.5 °F), Max:36.6 °C (97.9 °F)         Assessment/Plan     Patient was not given a loading dose   Will initiate vancomycin maintenance,         1500 mg q12h.    This dosing regimen is predicted by [employer] to result in the following pharmacokinetic parameters:    Loading dose: N/A  Regimen: 1500 mg IV every 12 hours.  Start time: 22:24 on 2024  Exposure target: AUC24 (range)400-600 mg/L.hr   [id number]: 522 mg/L.hr  AUC24,ss: 578 mg/L.hr  Probability of AUC24 > 400: 84 %  Ctrough,ss: 17.5 mg/L  Probability of Ctrough,ss > 20: 40 %    Follow-up level will be ordered on  at AM labs unless clinically indicated sooner.  Will continue to monitor renal function daily while on vancomycin and order serum creatinine at least every 48 hours if not already ordered.  Follow for continued vancomycin needs, clinical response, and signs/symptoms of toxicity.       HELIO SWENSON

## 2024-11-18 NOTE — ADDENDUM NOTE
Addendum  created 11/18/24 1302 by Malgorzata BARNETT Capp, CAA    Narcotic reconciliation edited

## 2024-11-18 NOTE — OP NOTE
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]     * Conductive hearing loss of right ear with unrestricted hearing of left ear [H90.11]     * History of bacterial meningitis [Z86.61]     Procedures  RIGHT SIDE MIDDLE CRANIAL FOSSA APPROACH FOR ENCEPHALOCELE REPAIR, TEGEMEN DEFECT REPAIR, AND POSSIBLE LUMBAR DRAIN FOR CSF LEAK  72496 - NE CRANIOTOMY FOR ENCEPHALOCELE REPAIR SKULL BASE    RIGHT SIDE MIDDLE CRANIAL FOSSA APPROACH FOR ENCEPHALOCELE REPAIR, TEGEMEN DEFECT REPAIR, LUMBAR DRAIN FOR CSF LEAK  17073 - NE CRANIOTOMY FOR ENCEPHALOCELE REPAIR SKULL BASE    NE SECONDARY RPR DURA CSF LEAK FREE TISSUE GRAFT [24811]  NE THERAPEUTIC SPINAL PUNCTURE DRAINAGE CSF [28696]  Surgeons   Panel 1:     * Sridhar Pedro - Primary  Panel 2:     * Cristela Tao - Primary    Resident/Fellow/Other Assistant:  Surgeons and Role:  Panel 1:     * Lavon Becerril MD - Resident - Assisting  Panel 2:     * Rose Bragg MD - Fellow    Staff:   Circulator: Elsy  Scrub Person: Demetra  Relief Circulator: Humberto  Relief Scrub: Aundrea  Relief Circulator: Jana    Anesthesia Staff: Anesthesiologist: Sohail Alfaro MD  C-AA: EDU Ji  Anesthesia Resident: Sridhar George DO    Procedure Summary  Anesthesia: General  ASA: III  Estimated Blood Loss: 50mL  Intra-op Medications:   Administrations occurring from 0700 to 1455 on 24:   Medication Name Total Dose   lidocaine-epinephrine (Xylocaine W/EPI) 1 %-1:100,000 injection 10 mL    sodium chloride 0.9% infusion 162.5 mL   acetaminophen (Tylenol) tablet 1,000 mg   ceFAZolin (Ancef) vial 1 g 2 g   cefTRIAXone (Rocephin) 1 g 2 g   dexAMETHasone (Decadron) injection 4 mg/mL 10 mg   ePHEDrine injection 5 mg   fentaNYL (Sublimaze) injection 50 mcg/mL 100 mcg   glycopyrrolate (Robinul) injection 0.8 mg   hydrALAZINE 20 mg/mL 5 mg   HYDROmorphone (Dilaudid) injection 1 mg/mL 1 mg   labetalol (Normodyne,Trandate) injection 25 mg   lactated Ringer's infusion Cannot be calculated   levETIRAcetam (Keppra) 500 mg/5mL 1,000 mg   lidocaine (Xylocaine) injection 2 % 80 mg   mannitol 20% 45 g   midazolam PF (Versed) injection 1 mg/mL 2 mg   neostigmine (Bloxiverz) 10 mg/10 mL injection 4 mg   ondansetron (Zofran) 2 mg/mL injection 4 mg   phenylephrine (Umang-Synephrine) 10 mg in sodium chloride 0.9% 250 mL (0.04 mg/mL) infusion (premix) 1.08 mg   phenylephrine 40 mcg/mL syringe 10 mL 480 mcg   propofol (Diprivan) injection 10 mg/mL 350 mg   remifentanil (Ultiva) 1,000 mcg in sodium chloride 0.9% 50 mL (20 mcg/mL) infusion 2.01 mg   rocuronium (ZeMuron) 50 mg/5 mL injection 60 mg   NaCl 0.9 % bolus Cannot be calculated   vancomycin 1 g 1.5 g              Anesthesia Record               Intraprocedure I/O Totals          Intake    NaCl 0.9 % bolus 1000.00 mL    Remifentanil Drip 0.00 mL    The total shown is the total volume documented since Anesthesia Start was filed.    Phenylephrine Drip 0.00 mL    The total shown is the total volume documented since Anesthesia Start was filed.    lactated Ringer's 1000.00 mL    Total Intake 2000 mL       Output    Urine 1025 mL    Total Output 1025 mL       Net    Net Volume 975 mL          Specimen:   ID Type Source Tests Collected by Time   1 : RIGHT MIDDLE EAR CONTENTS Tissue EAR, MIDDLE EAR CONTENTS RIGHT SURGICAL PATHOLOGY EXAM Cristela Tao MD 11/18/2024 1022                 Drains and/or Catheters:   Urethral Catheter Double-lumen;Non-latex 16 Fr. (Active)   Site  Assessment Clean;Skin intact 11/18/24 1246   Collection Container Standard drainage bag 11/18/24 1246   Securement Method Securing device (Describe) 11/18/24 1246   Reason for Continuing Urinary Catheterization accurate hourly measurement of urine volume in a critically ill patient that cannot be assessed by other volumes and urine collection strategies 11/18/24 1246   Output (mL) 55 mL 11/18/24 1500       Lumbar Drain (Active)   Status Clamped 11/18/24 1257   Site Description Healing 11/18/24 1257   Dressing Status Clean;Dry;Occlusive 11/18/24 1257       Tourniquet Times:         Implants:  Implants       Type Name Action Serial No.      Graft GRAFT MATRIX, DURAL, DURAGEN PLUS 2X2 (1/PK) - [id number] Implanted      Screw COVER, LW PROF JUAN MIGUEL HOLE, 20MM W/ - [id number] Implanted      Neuro Interventional Implant CROSS PIN, AXS SELF-TAP, 1.5 X 4MM - [id number] Implanted      Screw COVER, LW PROF JUAN MIGUEL HOLE, 10MM W/ - [id number] Implanted               Findings: 2 large defects. One over the ossicles. Mucopurulent material suctioned from the space around the ossicles. Glial tissues herniated into the middle ear. Repaired with bone, fascia and duraseal.    Indications: Juana Motley is an 49 y.o. 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 Imaging was consistent with right tegmen defect around the ossicles and MRI suggested encephalocele.     The patient was seen in the preoperative area. The risks, benefits, complications, treatment options, non-operative alternatives, expected recovery and outcomes were discussed with the patient. The possibilities of reaction to medication, pulmonary aspiration, injury to surrounding structures, bleeding, recurrent infection, the need for additional procedures, failure to diagnose a condition, and creating a complication requiring transfusion or  operation were discussed with the patient. The patient concurred with the proposed plan, giving informed consent.  The site of surgery was properly noted/marked if necessary per policy. The patient has been actively warmed in preoperative area. Preoperative antibiotics have been ordered and given within 1 hours of incision. Venous thrombosis prophylaxis have been ordered including bilateral sequential compression devices    Procedure Details: Operative procedure:  After receiving informed consent from the patient including the discussion of risks, benefits, and alternatives, the patient was taken to the operating room.  The patient was placed supine on the operative table.  A surgical timeout was performed. After induction of satisfactory general anesthesia, the patient was turned 180 degrees towards the surgeon.  Facial nerve electrodes were placed in orbicularis oculi and orbicularis oris muscle.  Placement was confirmed with facial monitoring.  Continuous facial nerve monitoring was performed for the rest of the case, which lasted approximately 2 hours.  Next, the patient was secured to the bed with 3 straps. Test roll was performed. The patient was adequately secured to the bed.  The ear was then prepped and draped in usual sterile fashion.    The temporal area was clipped of hair. The area was infiltrated with 1% lidocaine with 1 100,000 epinephrine after designing a reverse question lawson incision. The patient received ½ mg of mannitol per kilogram prior to incision. The patient was also hyperventilated. Once ear was prepped and draped in usual sterile fashion the skin was incised and this was carried down to the level of the temporalis muscle the temporalis fascia was exposed and harvested for a large piece of fascia for later reconstruction. This was set aside in saline. Next the muscle was incised in a trapdoor fashion and reflected inferiorly. Once the surface of the skull was exposed we identified the root  of the zygoma. We then created a craniotomy window centered over the root of the zygoma which was approximately 4.0 x 4.0 cm². The craniotomy window was turned by the neurosurgical service and will be dictated separately. Once the dura of the middle fossa was exposed, the dura was elevated off the floor of the middle fossa. There were numerous areas where the dura was quite adherent to the floor. The dura appeared very inflamed. There were some small holes with arachnoid granulations. There were 2 large bony defects, one over the ossicles and the other medial and anterior the head of the malleus. Shivam encephalocele was present in each. The necks were truncated and the glial tissue was removed from the middle ear. There was mucopus sucttioned from the atrum as well. Next we harvested some bone chips from the edge of the craniotomy window in the anterior inferior quadrant and the inner table of the calvarium. These bone chips were then placed directly over the defects in the tegmen.  Next Dr. Pedro came in to perform the secondary repair of the dural defects. This will be dictated separately. A 3 layer closure was created. We brought the bone flap back and it was secured to the calvarium with 3 dog bone low-profile Julian neuro-plating plates. 4 mm self-tapping tapping screws were used to secure the plate to the bone. The area was copiously irrigated with bacitracin saline. The muscle was then closed with interrupted 2-0 Vicryl. The skin was then closed in 2 layers of interrupted 3-0 Vicryl in a running locking 3-0 prolene. Standard mastoid dressing was applied the facial nerve electrodes were removed to his was returned to the care of anesthesia without incident and transferred to the recovery room in stable condition.   Complications:  None; patient tolerated the procedure well.    Disposition: PACU - hemodynamically stable.  Condition: stable                 Additional Details:     Attending Attestation: I  was present and scrubbed for the key portions of the procedure.    Cristela Tao MD  [phone]

## 2024-11-18 NOTE — ANESTHESIA PROCEDURE NOTES
Peripheral IV  Date/Time: 11/18/2024 7:56 AM      Placement  Needle size: 18 G  Laterality: right  Location: forearm  Local anesthetic: none  Site prep: chlorhexidine  Technique: anatomical landmarks  Attempts: 1

## 2024-11-18 NOTE — HOSPITAL COURSE
Juana Motley is a 49 year old female with PMH of ADHD, anxiety, bipolar I, DMII, JUAN (no CPAP), CAD on asa, p/w otorrhea, CTH/MRI R tegmen defect with encephalocele, 11/18 s/p R MCF, LD placement, CTH POC.  Lumbar clamping trial was initially performed on the evening of 11/20. The patient subsequently developed significant head and surgical site pain. So, the lumbar drain was unclamped and a CTH was obtained which was stable from her prior post-op CTH. This pain improved with the addition of Diamox. The drain was eventually removed on 11/22 after she tolerated a 2nd clamping trial. On the day of discharge, the patient was seen and evaluated and deemed suitable for discharge.  She was tolerating diet, ambulating independently, and pain is controlled. The patient was given detailed discharge instructions and were scheduled to follow up as an outpatient.   
Normal for race

## 2024-11-18 NOTE — CARE PLAN
Problem: Skin  Goal: Decreased wound size/increased tissue granulation at next dressing change  Outcome: Progressing  Goal: Participates in plan/prevention/treatment measures  Outcome: Progressing     Problem: Fall/Injury  Goal: Not fall by end of shift  Outcome: Progressing  Goal: Verbalize understanding of personal risk factors for fall in the hospital  Outcome: Progressing     Problem: Pain  Goal: Takes deep breaths with improved pain control throughout the shift  Outcome: Progressing  Goal: Performs ADL's with improved pain control throughout shift  Outcome: Progressing

## 2024-11-18 NOTE — PROGRESS NOTES
"    [facility]  NEUROSCIENCE INTENSIVE CARE UNIT  RECOVERY NOTE       Patient Name: Juana Motley     MRN: [id number]     Admit Date: 2024     : 1975 AGE: 49 y.o. GENDER: female    Dx: Conductive hearing loss of right ear with unrestricted hearing of left ear  PROCEDURE: Right Side Middle Cranial Fossa Approach for Encephalocele Repair, Tegemen Defect Repair, and Lumbar Drain Placement for CSF Leak   Subjective    49 year-old female with past medical history of bacterial meningitis, hearing loss, ADHD, anxiety/depression, bipolar disorder, hypertension, hypothyroidism, JUAN, and type 2 diabetes mellitus who presented with large bony defect and encephalocele causing conductive hearing loss of right ear with unrestricted hearing of left ear. Patient presented to [facility] for scheduled right side middle cranial fossa approach for encephalopathy, tegmen defect repair, and possible lumbar drain for CSF leak.    Arrival to NSU: 1230  Report received from anesthesia and neurosurgery.    OR Events:  Easy airway per anesthesia  2L crystalloid  1L urine output  No blood products given during case    Interval Events: Admitted to NSU post-operative.     Objective   VITALS:  Heart Rate:  [64-72]   Temp:  [36.4 °C (97.5 °F)-36.6 °C (97.9 °F)]   Resp:  [12-18]   BP: (125-128)/(60-89)   Height:  [167.6 cm (5' 6\")]   Weight:  [91.4 kg (201 lb 8 oz)-91.5 kg (201 lb 11.5 oz)]   SpO2:  [94 %-96 %]      INTAKE/OUTPUT:    Intake/Output Summary (Last 24 hours) at 2024 1339  Last data filed at 2024 1300  Gross per 24 hour   Intake 2018.75 ml   Output 1025 ml   Net 993.75 ml     PHYSICAL EXAM:  NEURO:  - Drowsy, minimal eye opening to voice, BARTON spontaneously when stimulated, follows commands, pupils 3mm/reactive bilaterally  CV:  - RRR on telemetry, NSR  - Left radial arterial line in place  RESP:  - Regular, unlabored  - Oxygen: RA  :  - Indwelling catheter in place  GI:  - Abdomen NT/ND, " soft  SKIN:  - Right head surgical incision clean/dry/intact    MEDICATIONS:  Scheduled: PRN: Continuous:   acetaminophen, 650 mg, oral, q6h  atomoxetine, 25 mg, oral, q AM  cefTRIAXone, 2 g, intravenous, q12h  dexAMETHasone, 8 mg, intravenous, q8h  fLuvoxaMINE, 50 mg, oral, Nightly  [START ON 11/20/2024] heparin (porcine), 5,000 Units, subcutaneous, q8h  hydroCHLOROthiazide, 12.5 mg, oral, Daily  levothyroxine, 100 mcg, oral, Nightly  [START ON 11/19/2024] pantoprazole, 40 mg, oral, Daily before breakfast   Or  [START ON 11/19/2024] pantoprazole, 40 mg, intravenous, Daily before breakfast  potassium chloride ER, 10 mEq, oral, Daily  rosuvastatin, 5 mg, oral, Daily  sennosides-docusate sodium, 2 tablet, oral, BID  spironolactone, 25 mg, oral, Daily  vancomycin, 1,500 mg, intravenous, q12h     PRN medications: benzocaine-menthol, clonazePAM, dextrose, dextrose, glucagon, glucagon, hydrALAZINE, HYDROmorphone, labetaloL, naloxone, ondansetron ODT **OR** ondansetron, oxyCODONE, oxyCODONE, oxyCODONE, oxygen, vancomycin sodium chloride 0.9%, 75 mL/hr, Last Rate: 75 mL/hr (11/18/24 1245)         LAB RESULTS:  Post-operative labs pending    Assessment/Plan    NEURO:  #S/p MCF approach for encephalocele repair, tegemen defect repair  #ADHD  #Anxiety  Assessment:  - Neurologically: see above  - Lumbar drain clamped until CTH, then drain 10 mL/hour  - Home medications: atomoxetine 25mg daily, clonazepam 0.5mg PRN  Plan:  - NSU - Post-op vitals and neuro checks: Q15min x4, then Q30 min x3, then Q1H  - Neuro Checks: Q1H  - Drain management per neurosurgery  - Dexamethasone 8mg Q8H  - Pain: scheduled acetaminophen, hydromorphone PRN, oxycodone PRN  - Nausea: ondansetron PRN  - Atomoxetine 25mg daily  - PT/OT  [] Post-op CTH    CARDIOVASCULAR:  #HTN, HLD  Assessment:  - Home medications: hydrochlorothiazide 12.5mg daily, rosuvastatin 5mg daily, spironolactone 25mg daily  - ECHO: none on file  Plan:  - Continue to monitor on  telemetry  - Hydrochlorothiazide 12.5mg daily, rosuvastatin 5mg daily, spironolactone 25mg daily  - BP goal: SBP < 140    --> PRN: Labetalol and Hydralazine     RESPIRATORY:  #No active issues  Assessment:  - RA  Plan:  - Continuous pulse oximetry   - O2 PRN to maintain SpO2 > 94%, wean as tolerated  - Incentive spirometry while awake    RENAL/:  #No active issues  Assessment:  - Baseline BUN/Cr: WNL   Plan:  - Monitor with daily RFP  - Remove catheter by POD#1    FEN/GI:  #No active issues  Assessment:  - Last BM: PTA  Plan:  - Monitor and replace electrolytes per protocol  - IVF: NS @ 75 mL/hr during recovery period  - Diet: carb controlled    - Bowel regimen: Tiara-Colace    ENDOCRINE:  #Hypothyroidism  Assessment:  Results from last 7 days   Lab Units 24  0610   POCT GLUCOSE mg/dL 110*   - Home medication: levothyroxine 100mcg daily, 50mcg on   Plan:  - Accuchecks & ISS PRN  - Levothyroxine 100mcg daily    HEMATOLOGY:  #No active issues  Assessment:  - Baseline Hgb: WNL  - Baseline Plts: WNL  Plan:  - Continue to monitor with daily CBC and Coag panel    INFECTIOUS DISEASE:  #No active issues  Assessment:  - Temp (24hrs), Av.5 °C (97.7 °F), Min:36.4 °C (97.5 °F), Max:36.6 °C (97.9 °F)  Plan:  - Continue to monitor for s/sx of infection  - Pan culture for temperature > 38.4 C  - Ceftriaxone 2g BID and vancomycin 1500mg BID for surgical prophylaxis per ENT    MUSCULOSKELETAL:  - No acute issues    SKIN:  - No acute issues  - Turns and skin care per NSU protocol    ACCESS:  - PIVs  - Left radial arterial line    PROPHYLAXIS:  - DVT Ppx: SCDs and Hold SQH until POD #2  - GI Ppx: Pantoprazole    RESTRAINTS:  Not indicated/Patient does not meet criteria for restraints    Elieser Alas, APRN-CNP  Neuroscience Intensive Care    Total critical care time of 60 minutes, with > 50% of time spent in direct contact with patient/family for education, counseling and coordination of care.

## 2024-11-18 NOTE — ANESTHESIA PROCEDURE NOTES
Peripheral IV  Date/Time: 11/18/2024 7:43 AM      Placement  Needle size: 16 G  Laterality: left  Local anesthetic: none  Site prep: chlorhexidine  Technique: anatomical landmarks  Attempts: 1

## 2024-11-18 NOTE — ANESTHESIA PROCEDURE NOTES
Airway  Date/Time: 11/18/2024 7:42 AM  Urgency: elective    Airway not difficult    Staffing  Performed: resident   Authorized by: Sohail Alfaro MD    Performed by: Sridhar George DO  Patient location during procedure: OR    Indications and Patient Condition  Indications for airway management: anesthesia  Spontaneous Ventilation: absent  Sedation level: deep  Preoxygenated: yes  Patient position: sniffing  Mask difficulty assessment: 2 - vent by mask + OA or adjuvant +/- NMBA  Planned trial extubation    Final Airway Details  Final airway type: endotracheal airway      Successful airway: ETT  Cuffed: yes   Successful intubation technique: direct laryngoscopy  Facilitating devices/methods: intubating stylet  Blade: James  Blade size: #3  ETT size (mm): 7.0  Cormack-Lehane Classification: grade I - full view of glottis  Placement verified by: chest auscultation and capnometry   Measured from: teeth  ETT to teeth (cm): 21  Number of attempts at approach: 1

## 2024-11-18 NOTE — ANESTHESIA POSTPROCEDURE EVALUATION
Patient: Juana Motley    Procedure Summary       Date: 11/18/24 Room / Location: [facility] OR 26 / [facility] OR    Anesthesia Start: 0729 Anesthesia Stop: 1237    Procedures:       RIGHT SIDE MIDDLE CRANIAL FOSSA APPROACH FOR ENCEPHALOCELE REPAIR, TEGEMEN DEFECT REPAIR, AND POSSIBLE LUMBAR DRAIN FOR CSF LEAK (Right: Head)      RIGHT SIDE MIDDLE CRANIAL FOSSA APPROACH FOR ENCEPHALOCELE REPAIR, TEGEMEN DEFECT REPAIR, LUMBAR DRAIN FOR CSF LEAK (Right: Head) Diagnosis:       Encephalocele      Conductive hearing loss of right ear with unrestricted hearing of left ear      History of bacterial meningitis      (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    Anesthesia Post Evaluation    Patient location during evaluation: ICU  Patient participation: complete - patient participated  Level of consciousness: awake and alert  Pain score: 0  Pain management: adequate  Airway patency: patent  Cardiovascular status: acceptable  Respiratory status: acceptable  Hydration status: acceptable  Postoperative Nausea and Vomiting: none        No notable events documented.

## 2024-11-18 NOTE — ANESTHESIA PROCEDURE NOTES
Arterial Line:    Date/Time: 11/18/2024 7:52 AM    Staffing  Performed: resident   Authorized by: Sohail Alfaro MD    Performed by: Sridhar George DO    An arterial line was placed. in the OR for the following indication(s): continuous blood pressure monitoring and blood sampling needed.    A 20 gauge (size) (length), Angiocath (type) catheter was placed into the Left radial artery, secured by Tegaderm,   Seldinger technique used.  Events:  patient tolerated procedure well with no complications.

## 2024-11-18 NOTE — H&P
History Of Present Illness  Juana Motley is a 49 y.o. female presenting with tegmen defect and encephalocele.     Past Medical History  She has a past medical history of ADHD (attention deficit hyperactivity disorder), Adverse effect of anesthesia, Anxiety, Bipolar 1 disorder (Multi), Bradycardia, Cervical radiculopathy, Chronic otitis media, Chronic tubotympanic suppurative otitis media of right ear, Coronary artery disease, Depression, Dizziness, Encephalocele, HL (hearing loss), Hypertension, Hypothyroidism, Migraine, Nephrolithiasis, Peripheral neuropathy, Pneumococcal meningitis ([facility]) (2024), Sleep apnea, TMJ (dislocation of temporomandibular joint), Tobacco use, Type 2 diabetes mellitus, and Vitamin D deficiency.    Surgical History  She has a past surgical history that includes Cardiac catheterization; Cholecystectomy; Hernia repair (2024); Dilation and curettage of uterus; Myringotomy w/ tubes (2024); Ear tube removal (2024); Tonsillectomy; and  section, low transverse.     Social History  She reports that she has quit smoking. Her smoking use included cigarettes. She uses smokeless tobacco. She reports that she does not drink alcohol and does not use drugs.    Family History  Family History   Problem Relation Name Age of Onset    Skin cancer Mother      Heart attack Father      Pancreatic cancer Paternal Grandmother          Allergies  Metformin, Penicillin, and Topiramate    Review of Systems   Constitutional: Negative.    HENT: Negative.          Physical Exam  Vitals reviewed.   HENT:      Head: Normocephalic.      Right Ear: External ear normal.      Left Ear: External ear normal.      Nose: Nose normal.      Mouth/Throat:      Mouth: Mucous membranes are moist.   Eyes:      Conjunctiva/sclera: Conjunctivae normal.   Cardiovascular:      Rate and Rhythm: Normal rate.   Pulmonary:      Effort: Pulmonary effort is normal.   Neurological:      Mental Status: She is alert.  "Mental status is at baseline.          Last Recorded Vitals  Blood pressure 128/89, pulse 69, temperature 36.4 °C (97.5 °F), temperature source Temporal, resp. rate 16, height 1.676 m (5' 6\"), weight 91.4 kg (201 lb 8 oz), SpO2 95%.       Assessment/Plan   Assessment & Plan  Conductive hearing loss of right ear with unrestricted hearing of left ear    Encephalocele    History of bacterial meningitis    -OR as scheduled for MCF approach, repair of tegmen defect, poss LD with neurosurgery    "

## 2024-11-18 NOTE — OP NOTE
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]     * Conductive hearing loss of right ear with unrestricted hearing of left ear [H90.11]     * History of bacterial meningitis [Z86.61]     Procedures  RIGHT SIDE MIDDLE CRANIAL FOSSA APPROACH FOR ENCEPHALOCELE REPAIR, TEGEMEN DEFECT REPAIR, AND POSSIBLE LUMBAR DRAIN FOR CSF LEAK  45167 - [place] CRANIOTOMY FOR ENCEPHALOCELE REPAIR SKULL BASE    RIGHT SIDE MIDDLE CRANIAL FOSSA APPROACH FOR ENCEPHALOCELE REPAIR, TEGEMEN DEFECT REPAIR, LUMBAR DRAIN FOR CSF LEAK  70285 - [place] CRANIOTOMY FOR ENCEPHALOCELE REPAIR SKULL BASE    [place] SECONDARY RPR DURA CSF LEAK FREE TISSUE GRAFT [36162]  [place] THERAPEUTIC SPINAL PUNCTURE DRAINAGE CSF [85668]  Surgeons   Panel 1:     * Sridhar Pedro - Primary  Panel 2:     * Cristela Tao - Primary    Resident/Fellow/Other Assistant:  Surgeons and Role:  Panel 1:     * Lavon Becerril MD - Resident - Assisting  Panel 2:     * Rose Bragg MD - Fellow    Staff:   Circulator: Elsy  Scrub Person: Demetra  Relief Circulator: Humberto  Relief Scrub: Aundrea  Relief Circulator: Jana    Anesthesia Staff: Anesthesiologist: Sohail Alfaro MD  C-AA: EDU Ji  Anesthesia Resident: Sridhar George DO    Procedure Summary  Anesthesia: General  ASA: III  Estimated Blood Loss: mL  Intra-op Medications:   Administrations occurring from 0700 to 1455 on 24:   Medication Name Total Dose   lidocaine-epinephrine (Xylocaine W/EPI) 1 %-1:100,000 injection 10 mL    acetaminophen (Tylenol) tablet 1,000 mg   ceFAZolin (Ancef) vial 1 g 2 g   cefTRIAXone (Rocephin) 1 g 2 g   dexAMETHasone (Decadron) injection 4 mg/mL 10 mg   ePHEDrine injection 5 mg   fentaNYL (Sublimaze) injection 50 mcg/mL 100 mcg   glycopyrrolate (Robinul) injection 0.8 mg   labetalol (Normodyne,Trandate) injection 20 mg   lactated Ringer's infusion Cannot be calculated   levETIRAcetam (Keppra) 500 mg/5mL 1,000 mg   lidocaine (Xylocaine) injection 2 % 80 mg   mannitol 20% 45 g   midazolam PF (Versed) injection 1 mg/mL 2 mg   neostigmine (Bloxiverz) 10 mg/10 mL injection 4 mg   ondansetron (Zofran) 2 mg/mL injection 4 mg   phenylephrine (Umang-Synephrine) 10 mg in sodium chloride 0.9% 250 mL (0.04 mg/mL) infusion (premix) 1.08 mg   phenylephrine 40 mcg/mL syringe 10 mL 480 mcg   propofol (Diprivan) injection 10 mg/mL 350 mg   remifentanil (Ultiva) 1,000 mcg in sodium chloride 0.9% 50 mL (20 mcg/mL) infusion 2.01 mg   rocuronium (ZeMuron) 50 mg/5 mL injection 60 mg   NaCl 0.9 % bolus Cannot be calculated   vancomycin 1 g 1.5 g              Anesthesia Record               Intraprocedure I/O Totals          Intake    NaCl 0.9 % bolus 250.00 mL    Remifentanil Drip 0.00 mL    The total shown is the total volume documented since Anesthesia Start was filed.    Phenylephrine Drip 0.00 mL    The total shown is the total volume documented since Anesthesia Start was filed.    Total Intake 250 mL       Output    Urine 925 mL    Total Output 925 mL       Net    Net Volume -675 mL          Specimen:   ID Type Source Tests Collected by Time   1 : RIGHT MIDDLE EAR CONTENTS Tissue EAR, MIDDLE EAR CONTENTS RIGHT SURGICAL PATHOLOGY EXAM Cristela Tao MD 11/18/2024 1022                 Drains and/or Catheters:   Urethral Catheter Double-lumen;Non-latex 16 Fr. (Active)       Lumbar Drain (Active)       Tourniquet Times:         Implants:  Implants       Type Name Action Serial No.      Graft GRAFT MATRIX, DURAL, DURAGEN PLUS 2X2  (1/PK) - [id number] Implanted      Screw COVER, LW PROF JUAN MIGUEL HOLE, 20MM W/ - [id number] Implanted      Neuro Interventional Implant CROSS PIN, AXS SELF-TAP, 1.5 X 4MM - [id number] Implanted      Screw COVER, LW PROF JUAN MIGUEL HOLE, 10MM W/ - [id number] Implanted               Findings:     Indications: Juana Motley is an 49 y.o. 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     The patient was seen in the preoperative area. The risks, benefits, complications, treatment options, non-operative alternatives, expected recovery and outcomes were discussed with the patient. The possibilities of reaction to medication, pulmonary aspiration, injury to surrounding structures, bleeding, recurrent infection, the need for additional procedures, failure to diagnose a condition, and creating a complication requiring transfusion or operation were discussed with the patient. The patient concurred with the proposed plan, giving informed consent.  The site of surgery was properly noted/marked if necessary per policy. The patient has been actively warmed in preoperative area. Preoperative antibiotics  Venous thrombosis prophylaxis     Procedure Details: Patient was placed supine head turn right sided postauricular skin incision performed Dr. Blankenship dictated separately in her note a middle fossa craniotomy was performed the high-speed drill exposing middle fossa dura with additional bony drilling by Dr. Tao exposure to the middle fossa floor large encephalocele with several bony defect was encountered with CSF leak identified this was repaired using locally harvested bone graft and synthetic dural graft and fascia using microdissection a lumbar drain had been placed prior to the procedure start in the L3-4 interspace with normal spinal fluid obtained under normal pressure allowed to drain  intermittently during the procedure and postoperatively the bone was then replaced using titanium plates and screws and the remainder of the closure performed Dr. Tao dictated separately in her note no complications  Complications:      Disposition:   Condition:                  Additional Details:     Attending Attestation:     Sridhar Pedro  Phone Number: [phone]

## 2024-11-19 LAB
ALBUMIN SERPL BCP-MCNC: 4.1 G/DL (ref 3.4–5)
ANION GAP SERPL CALC-SCNC: 13 MMOL/L (ref 10–20)
BUN SERPL-MCNC: 7 MG/DL (ref 6–23)
CA-I BLD-SCNC: 1.19 MMOL/L (ref 1.1–1.33)
CALCIUM SERPL-MCNC: 9 MG/DL (ref 8.6–10.6)
CHLORIDE SERPL-SCNC: 102 MMOL/L (ref 98–107)
CO2 SERPL-SCNC: 26 MMOL/L (ref 21–32)
CREAT SERPL-MCNC: 0.57 MG/DL (ref 0.5–1.05)
EGFRCR SERPLBLD CKD-EPI 2021: >90 ML/MIN/1.73M*2
ERYTHROCYTE [DISTWIDTH] IN BLOOD BY AUTOMATED COUNT: 13 % (ref 11.5–14.5)
GLUCOSE BLD MANUAL STRIP-MCNC: 151 MG/DL (ref 74–99)
GLUCOSE BLD MANUAL STRIP-MCNC: 173 MG/DL (ref 74–99)
GLUCOSE BLD MANUAL STRIP-MCNC: 175 MG/DL (ref 74–99)
GLUCOSE BLD MANUAL STRIP-MCNC: 176 MG/DL (ref 74–99)
GLUCOSE BLD MANUAL STRIP-MCNC: 207 MG/DL (ref 74–99)
GLUCOSE SERPL-MCNC: 171 MG/DL (ref 74–99)
HCT VFR BLD AUTO: 40.5 % (ref 36–46)
HGB BLD-MCNC: 13.4 G/DL (ref 12–16)
MAGNESIUM SERPL-MCNC: 1.67 MG/DL (ref 1.6–2.4)
MCH RBC QN AUTO: 30.7 PG (ref 26–34)
MCHC RBC AUTO-ENTMCNC: 33.1 G/DL (ref 32–36)
MCV RBC AUTO: 93 FL (ref 80–100)
NRBC BLD-RTO: 0 /100 WBCS (ref 0–0)
PHOSPHATE SERPL-MCNC: 3.5 MG/DL (ref 2.5–4.9)
PLATELET # BLD AUTO: 279 X10*3/UL (ref 150–450)
POTASSIUM SERPL-SCNC: 3.8 MMOL/L (ref 3.5–5.3)
RBC # BLD AUTO: 4.37 X10*6/UL (ref 4–5.2)
SODIUM SERPL-SCNC: 137 MMOL/L (ref 136–145)
VANCOMYCIN SERPL-MCNC: 19.8 UG/ML (ref 5–20)
WBC # BLD AUTO: 18 X10*3/UL (ref 4.4–11.3)

## 2024-11-19 PROCEDURE — 1170000001 HC PRIVATE ONCOLOGY ROOM DAILY

## 2024-11-19 PROCEDURE — 80069 RENAL FUNCTION PANEL: CPT | Performed by: STUDENT IN AN ORGANIZED HEALTH CARE EDUCATION/TRAINING PROGRAM

## 2024-11-19 PROCEDURE — 2500000004 HC RX 250 GENERAL PHARMACY W/ HCPCS (ALT 636 FOR OP/ED): Performed by: STUDENT IN AN ORGANIZED HEALTH CARE EDUCATION/TRAINING PROGRAM

## 2024-11-19 PROCEDURE — 2500000002 HC RX 250 W HCPCS SELF ADMINISTERED DRUGS (ALT 637 FOR MEDICARE OP, ALT 636 FOR OP/ED)

## 2024-11-19 PROCEDURE — 2500000001 HC RX 250 WO HCPCS SELF ADMINISTERED DRUGS (ALT 637 FOR MEDICARE OP): Performed by: STUDENT IN AN ORGANIZED HEALTH CARE EDUCATION/TRAINING PROGRAM

## 2024-11-19 PROCEDURE — 2500000002 HC RX 250 W HCPCS SELF ADMINISTERED DRUGS (ALT 637 FOR MEDICARE OP, ALT 636 FOR OP/ED): Performed by: STUDENT IN AN ORGANIZED HEALTH CARE EDUCATION/TRAINING PROGRAM

## 2024-11-19 PROCEDURE — 37799 UNLISTED PX VASCULAR SURGERY: CPT

## 2024-11-19 PROCEDURE — 97162 PT EVAL MOD COMPLEX 30 MIN: CPT | Mod: GP

## 2024-11-19 PROCEDURE — 82947 ASSAY GLUCOSE BLOOD QUANT: CPT

## 2024-11-19 PROCEDURE — 2500000001 HC RX 250 WO HCPCS SELF ADMINISTERED DRUGS (ALT 637 FOR MEDICARE OP)

## 2024-11-19 PROCEDURE — 2500000004 HC RX 250 GENERAL PHARMACY W/ HCPCS (ALT 636 FOR OP/ED)

## 2024-11-19 PROCEDURE — 82330 ASSAY OF CALCIUM: CPT

## 2024-11-19 PROCEDURE — 97535 SELF CARE MNGMENT TRAINING: CPT | Mod: GO

## 2024-11-19 PROCEDURE — 85027 COMPLETE CBC AUTOMATED: CPT | Performed by: STUDENT IN AN ORGANIZED HEALTH CARE EDUCATION/TRAINING PROGRAM

## 2024-11-19 PROCEDURE — 83735 ASSAY OF MAGNESIUM: CPT | Performed by: STUDENT IN AN ORGANIZED HEALTH CARE EDUCATION/TRAINING PROGRAM

## 2024-11-19 PROCEDURE — 80202 ASSAY OF VANCOMYCIN: CPT

## 2024-11-19 PROCEDURE — 97165 OT EVAL LOW COMPLEX 30 MIN: CPT | Mod: GO

## 2024-11-19 RX ORDER — INSULIN LISPRO 100 [IU]/ML
0-5 INJECTION, SOLUTION INTRAVENOUS; SUBCUTANEOUS
Status: DISCONTINUED | OUTPATIENT
Start: 2024-11-19 | End: 2024-11-23 | Stop reason: HOSPADM

## 2024-11-19 RX ORDER — METHOCARBAMOL 500 MG/1
750 TABLET, FILM COATED ORAL EVERY 8 HOURS SCHEDULED
Status: DISCONTINUED | OUTPATIENT
Start: 2024-11-19 | End: 2024-11-20

## 2024-11-19 ASSESSMENT — PAIN SCALES - GENERAL
PAINLEVEL_OUTOF10: 7
PAINLEVEL_OUTOF10: 8
PAINLEVEL_OUTOF10: 7
PAINLEVEL_OUTOF10: 6
PAINLEVEL_OUTOF10: 7
PAINLEVEL_OUTOF10: 5 - MODERATE PAIN
PAINLEVEL_OUTOF10: 7
PAINLEVEL_OUTOF10: 8
PAINLEVEL_OUTOF10: 6

## 2024-11-19 ASSESSMENT — PAIN DESCRIPTION - ORIENTATION: ORIENTATION: RIGHT

## 2024-11-19 ASSESSMENT — PAIN - FUNCTIONAL ASSESSMENT
PAIN_FUNCTIONAL_ASSESSMENT: 0-10

## 2024-11-19 ASSESSMENT — COGNITIVE AND FUNCTIONAL STATUS - GENERAL
CLIMB 3 TO 5 STEPS WITH RAILING: A LITTLE
MOVING TO AND FROM BED TO CHAIR: A LITTLE
TURNING FROM BACK TO SIDE WHILE IN FLAT BAD: A LITTLE
MOVING FROM LYING ON BACK TO SITTING ON SIDE OF FLAT BED WITH BEDRAILS: A LITTLE
TOILETING: A LITTLE
WALKING IN HOSPITAL ROOM: A LITTLE
HELP NEEDED FOR BATHING: A LITTLE
STANDING UP FROM CHAIR USING ARMS: A LITTLE
DRESSING REGULAR LOWER BODY CLOTHING: A LITTLE
DAILY ACTIVITIY SCORE: 21
MOBILITY SCORE: 18

## 2024-11-19 ASSESSMENT — PAIN DESCRIPTION - DESCRIPTORS
DESCRIPTORS: PRESSURE
DESCRIPTORS: PRESSURE;THROBBING

## 2024-11-19 ASSESSMENT — ACTIVITIES OF DAILY LIVING (ADL)
HOME_MANAGEMENT_TIME_ENTRY: 14
ADL_ASSISTANCE: INDEPENDENT
ADL_ASSISTANCE: INDEPENDENT

## 2024-11-19 ASSESSMENT — PAIN DESCRIPTION - LOCATION: LOCATION: HEAD

## 2024-11-19 NOTE — PROGRESS NOTES
"Juana Motley is a 49 y.o. female on day 1 of admission presenting with Conductive hearing loss of right ear with unrestricted hearing of left ear.       Subjective   Doing well. No acute events overnight. Post-op head with acute post op changes       Objective     Physical Exam  Non labored breathing  No Acute distress  Normal heart rate  Jose dressing over the right ear  HB 1/6  Pastor to the left, AC>BC on the left, AC>BC in the right      Last Recorded Vitals  Blood pressure 118/68, pulse 60, temperature 36.6 °C (97.9 °F), temperature source Temporal, resp. rate 18, height 1.676 m (5' 6\"), weight 91.5 kg (201 lb 11.5 oz), SpO2 95%.  Intake/Output last 3 Shifts:  I/O last 3 completed shifts:  In: 3368.8 (36.8 mL/kg) [P.O.:350; I.V.:1468.8 (16.1 mL/kg); IV Piggyback:1550]  Out: 2875 (31.4 mL/kg) [Urine:2775 (0.8 mL/kg/hr)]  Weight: 91.5 kg     Relevant Results        Scheduled medications  acetaminophen, 650 mg, oral, q6h  atomoxetine, 25 mg, oral, q AM  cefTRIAXone, 2 g, intravenous, q12h  dexAMETHasone, 8 mg, intravenous, q8h  fLuvoxaMINE, 50 mg, oral, Nightly  [START ON 11/20/2024] heparin (porcine), 5,000 Units, subcutaneous, q8h  hydroCHLOROthiazide, 12.5 mg, oral, Daily  insulin lispro, 0-5 Units, subcutaneous, TID AC  levothyroxine, 100 mcg, oral, Nightly  methocarbamol, 750 mg, oral, q8h LATISHA  pantoprazole, 40 mg, oral, Daily before breakfast   Or  pantoprazole, 40 mg, intravenous, Daily before breakfast  rosuvastatin, 5 mg, oral, Daily  sennosides-docusate sodium, 2 tablet, oral, BID  spironolactone, 25 mg, oral, Daily  vancomycin, 1,500 mg, intravenous, q12h      Continuous medications     PRN medications  PRN medications: benzocaine-menthol, clonazePAM, dextrose, dextrose, glucagon, glucagon, naloxone, ondansetron ODT **OR** ondansetron, oxyCODONE, oxyCODONE, oxyCODONE, oxygen, vancomycin                         Assessment/Plan   Assessment & Plan  Conductive hearing loss of right ear with " unrestricted hearing of left ear    Encephalocele    History of bacterial meningitis      48y/o female with history of right tegmen defect, now OR s/p MCF approach for repair with Dr. Tao and Dr. Pedro.    -Drains: LD at 10/hr - clamp 11/20 PM per NSGY   -Analgesia:  Scheduled Acetaminophen, Oxycodone 5 and 10mg prn  -Neuro: Home Atomoxetine, Fluvoxamine, Klonopin prn  -FEN: Soft diet, monitor and replete electrolytes as needed  -Pulm: Non-labored breathing; incentive spirometer 10x/hr while awake  -ID: On Vanc/Ceftriaxone; will transition to PO abx on discharge  -Cardiac: On home hydrochlorothiazide, Spironolactone  -Heme: Monitor H/H, daily CBC  -Endo: Insulin sliding scale & blood glucose POCT Q6hrs x 3days  -GI: Bowel regimen, PPI,  and PRN anti-emetic  -: Tate dc'd 11/19. Voiding spontaneously  -Steroids/Special: On Decadron 8q8 to end 11/20  -Embolic PPx: SQH, SCDs while in bed  -Dispo: On RNF, dispo pending lumbar drain removal. PT recommending low intensity therapy. OT recs pending           Yudi Bailey MD

## 2024-11-19 NOTE — PROGRESS NOTES
Physical Therapy    Physical Therapy Evaluation    Patient Name: Juana Motley  MRN: [id number]  Department: [facility]  Room: [facility]-A  Today's Date: 11/19/2024   Time Calculation  Start Time: 1142  Stop Time: 1200  Time Calculation (min): 18 min    Assessment/Plan   PT Assessment  PT Assessment Results: Decreased strength, Impaired balance, Decreased mobility  Rehab Prognosis: Good  Barriers to Discharge: medical acuity  Evaluation/Treatment Tolerance: Patient tolerated treatment well  Strengths: Ability to acquire knowledge  End of Session Communication: Bedside nurse  Assessment Comment: 48 y/o F who presents with otorrhea, now s/p R MCF, LD placement. patient demo's impaired balance and functional mobility. patient would benefit from continued skilled PT services at a LOW intensity upon D/C.  End of Session Patient Position: Up in chair, Alarm off, not on at start of session  IP OR SWING BED PT PLAN  Inpatient or Swing Bed: Inpatient  PT Plan  Treatment/Interventions: Bed mobility, Transfer training, Gait training, Stair training, Balance training, Strengthening, Therapeutic exercise, Therapeutic activity, Positioning, Postural re-education  PT Plan: Ongoing PT  PT Frequency: 4 times per week  PT Discharge Recommendations: Low intensity level of continued care  PT Recommended Transfer Status: Assist x1, Contact guard  PT - OK to Discharge: Yes    Subjective   General Visit Information:  General  Reason for Referral: presents with otorrhea; CTH/MRI-> R tegmen defect with encephalocele; s/p R MCF, LD placement  Past Medical History Relevant to Rehab: ADHD, anxiety, bipolar disorder, T2DM, JUAN, CAD on ASA; denied falls/6  months  Family/Caregiver Present: Yes  Caregiver Feedback:  at bedside  Co-Treatment:  (rehab tech in room to assist with line management)  Prior to Session Communication: Bedside nurse  Patient Position Received: Alarm off, not on at start of session, Up in chair  General Comment:  agreeable to participate in therapy, pleasant.  Home Living:  Home Living  Type of Home: House  Lives With: Spouse  Home Adaptive Equipment: None  Home Layout: Two level, Bed/bath upstairs  Home Access: Stairs to enter with rails  Entrance Stairs-Rails:  (x1)  Entrance Stairs-Number of Steps: 3  Bathroom Shower/Tub: Tub/shower unit  Bathroom Equipment: None  Home Living Comments: 10-12 stairs with HR to 2nd floor  Prior Level of Function:  Prior Function Per Pt/Caregiver Report  Level of [place]: Independent with ADLs and functional transfers, Independent with homemaking with ambulation  ADL Assistance: Independent  Homemaking Assistance: Independent  Ambulatory Assistance: Independent  Hand Dominance: Right  Prior Function Comments: (+) drive  Precautions:  Precautions  Hearing/Visual Limitations: reduced on R per patient report, denied blurry/double vision  Medical Precautions: Fall precautions  Precautions Comment: per RN, SBP <140 goal     Vital Signs     Vitals Session Pre PT During PT Post PT   Heart Rate (BPM) 73  67   Resp (RR) 19  16   SpO2 % 95  95   /67  127/57   MAP (mmHg) 83  78             Objective   Pain:  Pain Assessment  Pain Assessment: 0-10  0-10 (Numeric) Pain Score:  (did not rate numerically)  Pain Type: Acute pain, Surgical pain  Pain Location:  (R side of face, R ear region)  Pain Descriptors: Pressure  Pain Frequency: Constant/continuous  Cognition:  Cognition  Overall Cognitive Status: Within Functional Limits  Arousal/Alertness: Appropriate responses to stimuli  Orientation Level:  (AxO x3)  Following Commands: Follows all commands and directions without difficulty    General Assessments:        Activity Tolerance  Early Mobility/Exercise Safety Screen: Proceed with mobilization - No exclusion criteria met    Sensation  Light Touch:  (patient did not report)    Strength  Strength Comments: grossly WFL BUEs/BLEs  Postural Control  Postural Control: Impaired  Posture Comment:  mild postural sway with ambulation    Static Sitting Balance  Static Sitting-Balance Support: Feet supported, No upper extremity supported  Static Sitting-Level of Assistance: Distant supervision  Dynamic Sitting Balance  Dynamic Sitting-Balance Support: Feet supported, No upper extremity supported  Dynamic Sitting-Level of Assistance: Close supervision    Static Standing Balance  Static Standing-Balance Support: No upper extremity supported  Static Standing-Level of Assistance: Close supervision  Static Standing-Comment/Number of Minutes: x1  Dynamic Standing Balance  Dynamic Standing-Balance Support:  (intermittent L hand support from PT)  Dynamic Standing-Level of Assistance: Contact guard  Dynamic Standing-Comments: x1, mild unsteadiness  Functional Assessments:  Bed Mobility  Bed Mobility: No    Transfers  Transfer: Yes  Transfer 1  Transfer From 1: Sit to, Stand to  Transfer to 1: Sit, Stand  Technique 1: Stand to sit, Sit to stand  Transfer Level of Assistance 1: Close supervision, Minimal verbal cues    Ambulation/Gait Training  Ambulation/Gait Training Performed: Yes  Ambulation/Gait Training 1  Surface 1: Level tile  Device 1: No device  Assistance 1: Contact guard, Minimal verbal cues  Quality of Gait 1: Decreased step length  Comments/Distance (ft) 1: x100 ft; no acute LOB however, mild postural sway  Extremity/Trunk Assessments:  RUE   RUE :  (AROM WFL)  LUE   LUE:  (AROM WFL)  RLE   RLE :  (AROM WFL)  LLE   LLE :  (AROM WFL)  Outcome Measures:  [facility] Basic Mobility  Turning from your back to your side while in a flat bed without using bedrails: A little  Moving from lying on your back to sitting on the side of a flat bed without using bedrails: A little  Moving to and from bed to chair (including a wheelchair): A little  Standing up from a chair using your arms (e.g. wheelchair or bedside chair): A little  To walk in hospital room: A little  Climbing 3-5 steps with railing: A little  Basic Mobility -  Total Score: 18    FSS-ICU  Ambulation: Walks >/ or equal to 50 feet with any assistance x1  Rolling: Minimal assistance (performs 75% or more of task)  Sitting: Supervision or set-up only  Transfer Sit-to-Stand: Minimal assistance (performs 75% or more of task)  Transfer Supine-to-Sit: Minimal assistance (performs 75% or more of task)  Total Score: 19      Early Mobility/Exercise Safety Screen: Proceed with mobilization - No exclusion criteria met  ICU Mobility Scale: Walking with assistance of 1 person [8]  E = Exercise and Early Mobility  Early Mobility/Exercise Safety Screen: Proceed with mobilization - No exclusion criteria met  ICU Mobility Scale: Walking with assistance of 1 person    Encounter Problems       Encounter Problems (Active)       PT Problem       Patient will complete bed mobility with independence        Start:  11/19/24    Expected End:  12/03/24            Patient will complete STS with independence using No Device without acute LOB         Start:  11/19/24    Expected End:  12/03/24            Patient will ambulate >/=200' with No Device with independence without acute LOB, or postural sway        Start:  11/19/24    Expected End:  12/03/24            Patient will ascend/descend 12 steps with x1 handrail and independence without acute LOB.          Start:  11/19/24    Expected End:  12/03/24            Patient will independently complete standing dynamic balance activities without acute LOB, while maintaining midline posture.        Start:  11/19/24    Expected End:  12/03/24               Pain - Adult              Education Documentation  Mobility Training, taught by Christy Barrera PT at 11/19/2024  2:50 PM.  Learner: Patient  Readiness: Acceptance  Method: Explanation  Response: Needs Reinforcement  Comment: OOB with staff assist    Education Comments  No comments found.    Christy Barrera, HYALEY, DPT

## 2024-11-19 NOTE — ASSESSMENT & PLAN NOTE
50y/o female with history of right tegmen defect, now OR s/p MCF approach for repair with Dr. Tao and Dr. Pedro.

## 2024-11-19 NOTE — PROGRESS NOTES
Occupational Therapy    Evaluation and Treatment    Patient Name: Juana Motley  MRN: [id number]  Today's Date: 11/19/2024  Room: [address] [place] [address]  Time Calculation  Start Time: 1050  Stop Time: 1118  Time Calculation (min): 28 min    Assessment  IP OT Assessment  Prognosis: Good  Barriers to Discharge: None  Evaluation/Treatment Tolerance: Patient tolerated treatment well  Medical Staff Made Aware: Yes  End of Session Communication: Bedside nurse  End of Session Patient Position: Up in chair, Alarm off, not on at start of session  Plan:  Inpatient Plan  Treatment Interventions: ADL retraining, Functional transfer training, UE strengthening/ROM, Neuromuscular reeducation, Compensatory technique education  OT Frequency: 2 times per week  OT Discharge Recommendations: Low intensity level of continued care  Equipment Recommended upon Discharge:  (TBD)  OT Recommended Transfer Status:  (CGA)  OT - OK to Discharge: Yes  OT Assessment  OT Assessment Results: Decreased ADL status, Decreased functional mobility, Decreased IADLs  Prognosis: Good  Barriers to Discharge: None  Evaluation/Treatment Tolerance: Patient tolerated treatment well  Medical Staff Made Aware: Yes  Strengths: Ability to acquire knowledge, Attitude of self, Coping skills, Support and attitude of living partners    Subjective   Current Problem:  1. Encephalocele  Surgical Pathology Exam    Surgical Pathology Exam      2. Conductive hearing loss of right ear with unrestricted hearing of left ear  Surgical Pathology Exam    Surgical Pathology Exam      3. History of bacterial meningitis  Surgical Pathology Exam    Surgical Pathology Exam        General:  Reason for Referral: presents with otorrhea; CTH/MRI-> R tegmen defect with encephalocele; s/p R MCF, LD placement  Past Medical History Relevant to Rehab: ADHD, anxiety, bipolar disorder, T2DM, JUAN, CAD on ASA; denied falls/6  months  Prior to Session Communication: Bedside nurse  Patient Position Received:  Bed, 3 rail up, Alarm off, not on at start of session  Family/Caregiver Present: Yes  Caregiver Feedback:  present and supportive during session  General Comment: Pt pleasant and agreeable to therapy.   Precautions:  Hearing/Visual Limitations: reduced hearing on R side, vision WFL  Medical Precautions: Fall precautions  Precautions Comment: SBP < 140  Vital Signs:    Vital Signs     Vitals Session Pre OT During OT Post OT   Heart Rate 70  68   Resp  15  16   SpO2 93  94   /57 123/66 122/67   MAP 75 81 83   ICP           Pain:  Pain Assessment  Pain Assessment: 0-10  0-10 (Numeric) Pain Score: 7  Pain Type: Surgical pain, Acute pain  Pain Location: Head  Lines/Tubes/Drains:  Lumber drain, PIV, tele        Objective   Cognition:  Overall Cognitive Status: Within Functional Limits  Arousal/Alertness: Appropriate responses to stimuli  Orientation Level: Oriented X4  Following Commands: Follows all commands and directions without difficulty  Attention: Within Functional Limits  Memory: Within Funtional Limits  Safety/Judgement: Within Functional Limits  Insight: Within function limits  Impulsive: Within functional limits  Processing Speed: Within funtional limits  Cognition Test Scores  Cognition Tests: Cognition Test Performed  SBT Test Score: Pt scored 4/28 on SBT, demonstrating normal cognition (normal =0-4). Pt made 2 errors with delayed recall.  Confusion Assessment Method (CAM)  Acute Onset and Fluctuating Course (1A): No  Paula Agitation Sedation Scale  Paula Agitation Sedation Scale (RASS): Alert and calm  Home Living:  Type of Home: House  Lives With: Spouse ( works but can take time off as needed to assist pt at home, and 28 y.o. daughter)  Home Adaptive Equipment: None  Home Layout: Two level, Bed/bath upstairs, Stairs to alternate level with rails  Alternate Level Stairs-Number of Steps: full flight  Home Access: Stairs to enter with rails  Entrance Stairs-Number of Steps:  3  Bathroom Shower/Tub: Tub/shower unit  Bathroom Toilet: Standard  Bathroom Equipment: None   Prior Function:  Level of [place]: Independent with ADLs and functional transfers, Independent with homemaking with ambulation  ADL Assistance: Independent  Homemaking Assistance: Independent  Ambulatory Assistance: Independent  Vocational:  ((-) work)  Hand Dominance: Right  Prior Function Comments: (+) drives  IADL History:  Homemaking Responsibilities: Yes  ADL:  Eating Assistance: Independent  Grooming Assistance: Independent  Bathing Assistance:  (CGA)  Bathing Deficit:  (anticipated)  UE Dressing Assistance: Independent  LE Dressing Assistance:  (CGA)  Toileting Assistance with Device:  (CGA)  Activity Tolerance:  Endurance: Endurance does not limit participation in activity  Early Mobility/Exercise Safety Screen: Proceed with mobilization - No exclusion criteria met  Balance:  Dynamic Sitting Balance  Dynamic Sitting-Level of Assistance: Close supervision  Dynamic Standing Balance  Dynamic Standing-Level of Assistance: Contact guard  Static Sitting Balance  Static Sitting-Level of Assistance: Close supervision  Static Standing Balance  Static Standing-Level of Assistance: Close supervision, Contact guard  Bed Mobility/Transfers: Bed Mobility/Transfers: Bed Mobility  Bed Mobility: Yes  Bed Mobility 1  Bed Mobility 1: Supine to sitting  Level of Assistance 1: Contact guard  Bed Mobility Comments 1: HOB slighlty elevated  Functional Mobility  Functional Mobility Performed: Yes  Functional Mobility 1  Surface 1: Level tile  Device 1: No device  Assistance 1: Contact guard, Hand held assistance  Comments 1: cues for sequencing, 1 LOB, but able to correct with CGA   and Transfers  Transfer: Yes  Transfer 1  Technique 1: Sit to stand, Stand to sit  Transfer Device 1:  (no AD)  Transfer Level of Assistance 1: Contact guard  Trials/Comments 1: cues for hand placement  IADL's:   Homemaking Responsibilities: Yes  Vision:  Vision - Basic Assessment  Current Vision: Wears glasses only for reading   and Vision - Complex Assessment  Tracking: WFL  Sensation:  Light Touch: No apparent deficits  Sensation Comment: denies N/T  Strength:     Perception:  Inattention/Neglect: Appears intact  Coordination:  Movements are Fluid and Coordinated: Yes   Hand Function:  Hand Function  Gross Grasp: Functional  Coordination: Functional  Extremities:   RUE   RUE : Exceptions to WFL  RUE Strength  R Shoulder Flexion: 3+/5  R Shoulder Extension: 4-/5  R Elbow Flexion: 4-/5  R Elbow Extension: 4/5, LUE   LUE: Exceptions to WFL  LUE Strength  L Shoulder Flexion: 3+/5  L Shoulder Extension: 4-/5  L Elbow Flexion: 4/5  L Elbow Extension: 4/5, RLE   RLE : Within Functional Limits, and LLE   LLE : Within Functional Limits      Outcome Measures: [facility] Daily Activity  Putting on and taking off regular lower body clothing: A little  Bathing (including washing, rinsing, drying): A little  Putting on and taking off regular upper body clothing: None  Toileting, which includes using toilet, bedpan or urinal: A little  Taking care of personal grooming such as brushing teeth: None  Eating Meals: None  Daily Activity - Total Score: 21    Confusion Assessment Method-ICU (CAM-ICU)  Feature 3: Altered Level of Consciousness: Negative   ICU Mobility Screen  Early Mobility/Exercise Safety Screen: Proceed with mobilization - No exclusion criteria met  ICU Mobility Scale: Walking with assistance of 1 person,   Paula Agitation Sedation Scale  Paula Agitation Sedation Scale (RASS): Alert and calm      Education Documentation  Body Mechanics, taught by Leanne Mcpherson OT at 11/19/2024  5:39 PM.  Learner: Patient  Readiness: Acceptance  Method: Explanation, Demonstration  Response: Verbalizes Understanding, Demonstrated Understanding  Comment: OT POC    Precautions, taught by Leanne Mcpherson OT at 11/19/2024  5:39 PM.  Learner: Patient  Readiness: Acceptance  Method:  Explanation, Demonstration  Response: Verbalizes Understanding, Demonstrated Understanding  Comment: OT POC    ADL Training, taught by Leanne Mcpherson OT at 11/19/2024  5:39 PM.  Learner: Patient  Readiness: Acceptance  Method: Explanation, Demonstration  Response: Verbalizes Understanding, Demonstrated Understanding  Comment: OT POC    Education Comments  No comments found.        Goals:   Encounter Problems       Encounter Problems (Active)       ADLs       Patient will perform UB and LB bathing with Mod I.       Start:  11/19/24    Expected End:  12/03/24            Patient with complete lower body dressing with Mod I.       Start:  11/19/24    Expected End:  12/03/24            Patient will complete toileting including hygiene clothing management/hygiene with Mod I.       Start:  11/19/24    Expected End:  12/03/24            Pt will perform simulated IADL tasks with Mod I using EC/WS principles as needed and demonstrating good safety awareness for safe return home to prior living environment.         Start:  11/19/24    Expected End:  12/03/24               MOBILITY       Pt will perform functional mobility household distances with Mod I using LRAD without LOB and demonstrating good safety awareness.        Start:  11/19/24    Expected End:  12/03/24               TRANSFERS       Pt will perform functional transfers on various surfaces with Mod I using LRAD with good safety awareness.        Start:  11/19/24    Expected End:  12/03/24                   Treatment Completed on Evaluation      Activities of Daily Living:    Grooming  Grooming Level of Assistance: Close supervision  Grooming Comments: Pt completed hand hygiene in standing at sink with SBA           LE Dressing  LE Dressing: Yes  Pants Level of Assistance: Contact guard  Sock Level of Assistance: Close supervision  LE Dressing Where Assessed: Edge of bed, Bed level  LE Dressing Comments: Pt donned socks in bed with supervision, donned pants and  briefs in chair with CGA and wtood with CGA to don over hips.  Toileting  Toileting Level of Assistance: Contact guard  Where Assessed: Toilet  Toileting Comments: Pt managed clothing and hygiene in standing with CGA.      Therapy/Activity:     Therapeutic Activity  Therapeutic Activity Performed: Yes  Therapeutic Activity 1: Pt sat EOB x 10 min with SBA with VSS.  Therapeutic Activity 2: ptcompleted 3 trials sit <> stand with CGA with cues for proper hand placement. Pt completed 2 trials functional mobility with HHA and CGA with 1 LOB with CGA to correct and slight unsteadiness but improved as session progressed.     11/19/24 at 5:40 PM   Leanne Mcpherson, OT   Rehab Office: [phone]

## 2024-11-19 NOTE — CARE PLAN
The patient's goals for the shift include      The clinical goals for the shift include pt will have controlled pain throughout the shift    Over the shift, the patient did  make progress toward the following goals.   Problem: Fall/Injury  Goal: Not fall by end of shift  Outcome: Progressing  Goal: Be free from injury by end of the shift  Outcome: Progressing     Problem: Pain  Goal: Takes deep breaths with improved pain control throughout the shift  Outcome: Progressing  Goal: Turns in bed with improved pain control throughout the shift  Outcome: Progressing     Problem: Pain - Adult  Goal: Verbalizes/displays adequate comfort level or baseline comfort level  Outcome: Progressing     Problem: Safety - Adult  Goal: Free from fall injury  Outcome: Progressing

## 2024-11-19 NOTE — PROGRESS NOTES
"Juana Motley is a 49 y.o. female on day 1 of admission presenting with Conductive hearing loss of right ear with unrestricted hearing of left ear.    Subjective   Sig pain overnight, started flexeril       Objective     Physical Exam  Aox3  FS  BUE / BLE prox 5 dist 5  LD site cdi  Dressing in place  Last Recorded Vitals  Blood pressure 124/76, pulse 67, temperature 36.6 °C (97.9 °F), temperature source Temporal, resp. rate 14, height 1.676 m (5' 6\"), weight 91.5 kg (201 lb 11.5 oz), SpO2 94%.  Intake/Output last 3 Shifts:  I/O last 3 completed shifts:  In: 2493.8 (27.3 mL/kg) [P.O.:100; I.V.:1393.8 (15.2 mL/kg); IV Piggyback:1000]  Out: 1500 (16.4 mL/kg) [Urine:1500 (0.5 mL/kg/hr)]  Weight: 91.5 kg     Relevant Results                 CTH POC         Assessment/Plan   Assessment & Plan  Conductive hearing loss of right ear with unrestricted hearing of left ear    Encephalocele    History of bacterial meningitis    49F h/o ADHD, anxiety, bipolar I, DMII, JUAN (no CPAP), CAD on asa, p/w otorrhea, CTH/MRI R tegmen defect with encephalocele, 11/18 s/p R MCF, LD placement    Floor, tx to S5 in AM under ENT  LD at 10/hr - clamp 11/20 PM  ENT recs  PTOT  SQH 11/20  Abx per ENT           Valentino Villafuerte MD      "

## 2024-11-19 NOTE — PROGRESS NOTES
Vancomycin Dosing by Pharmacy- FOLLOW UP    Juana Motley is a 49 y.o. year old female who Pharmacy has been consulted for vancomycin dosing for CNS/meningoencephalitis. Based on the patient's indication and renal status this patient is being dosed based on a goal AUC of 500-600.     Renal function is currently stable.    Current vancomycin dose: 1500 mg given every 12 hours    Estimated vancomycin AUC on current dose: 548 mg/L.hr     Visit Vitals  /59 (BP Location: Left arm, Patient Position: Lying)   Pulse 80   Temp 36.6 °C (97.9 °F) (Temporal)   Resp 17        Lab Results   Component Value Date    CREATININE 0.57 2024    CREATININE 0.69 2024        Patient weight is as follows:   Vitals:    24 1246   Weight: 91.5 kg (201 lb 11.5 oz)       Cultures:  No results found for the encounter in last 14 days.       I/O last 3 completed shifts:  In: 3368.8 (36.8 mL/kg) [P.O.:350; I.V.:1468.8 (16.1 mL/kg); IV Piggyback:1550]  Out: 2665 (29.1 mL/kg) [Urine:2575 (0.8 mL/kg/hr)]  Weight: 91.5 kg   I/O during current shift:  No intake/output data recorded.    Temp (24hrs), Av.7 °C (98.1 °F), Min:36.6 °C (97.9 °F), Max:37.1 °C (98.8 °F)      Assessment/Plan    Within goal AUC range. Continue current vancomycin regimen.    This dosing regimen is predicted by [employer] to result in the following pharmacokinetic parameters:  Loading dose: N/A  Regimen: 1500 mg IV every 12 hours.  Start time: 11:09 on 2024  Exposure target: AUC24 (range)400-600 mg/L.hr   [id number]: 516 mg/L.hr  AUC24,ss: 548 mg/L.hr  Probability of AUC24 > 400: 89 %  Ctrough,ss: 16.6 mg/L  Probability of Ctrough,ss > 20: 31 %  The next level will be obtained on  at am labs. May be obtained sooner if clinically indicated.   Will continue to monitor renal function daily while on vancomycin and order serum creatinine at least every 48 hours if not already ordered.  Follow for continued vancomycin needs, clinical response, and  signs/symptoms of toxicity.       DARIUSZ MESA

## 2024-11-20 ENCOUNTER — PHARMACY VISIT (OUTPATIENT)
Dept: PHARMACY | Facility: CLINIC | Age: 49
End: 2024-11-20
Payer: COMMERCIAL

## 2024-11-20 LAB
ALBUMIN SERPL BCP-MCNC: 3.8 G/DL (ref 3.4–5)
ANION GAP SERPL CALC-SCNC: 15 MMOL/L (ref 10–20)
BUN SERPL-MCNC: 10 MG/DL (ref 6–23)
CALCIUM SERPL-MCNC: 9.4 MG/DL (ref 8.6–10.6)
CHLORIDE SERPL-SCNC: 104 MMOL/L (ref 98–107)
CO2 SERPL-SCNC: 26 MMOL/L (ref 21–32)
CREAT SERPL-MCNC: 0.64 MG/DL (ref 0.5–1.05)
EGFRCR SERPLBLD CKD-EPI 2021: >90 ML/MIN/1.73M*2
ERYTHROCYTE [DISTWIDTH] IN BLOOD BY AUTOMATED COUNT: 13.2 % (ref 11.5–14.5)
GLUCOSE BLD MANUAL STRIP-MCNC: 155 MG/DL (ref 74–99)
GLUCOSE BLD MANUAL STRIP-MCNC: 157 MG/DL (ref 74–99)
GLUCOSE BLD MANUAL STRIP-MCNC: 170 MG/DL (ref 74–99)
GLUCOSE SERPL-MCNC: 140 MG/DL (ref 74–99)
HCT VFR BLD AUTO: 37.6 % (ref 36–46)
HGB BLD-MCNC: 12.6 G/DL (ref 12–16)
MAGNESIUM SERPL-MCNC: 1.91 MG/DL (ref 1.6–2.4)
MCH RBC QN AUTO: 31.4 PG (ref 26–34)
MCHC RBC AUTO-ENTMCNC: 33.5 G/DL (ref 32–36)
MCV RBC AUTO: 94 FL (ref 80–100)
NRBC BLD-RTO: 0 /100 WBCS (ref 0–0)
PHOSPHATE SERPL-MCNC: 2.1 MG/DL (ref 2.5–4.9)
PLATELET # BLD AUTO: 294 X10*3/UL (ref 150–450)
POTASSIUM SERPL-SCNC: 3.9 MMOL/L (ref 3.5–5.3)
RBC # BLD AUTO: 4.01 X10*6/UL (ref 4–5.2)
SODIUM SERPL-SCNC: 141 MMOL/L (ref 136–145)
WBC # BLD AUTO: 20.7 X10*3/UL (ref 4.4–11.3)

## 2024-11-20 PROCEDURE — 2500000004 HC RX 250 GENERAL PHARMACY W/ HCPCS (ALT 636 FOR OP/ED): Performed by: STUDENT IN AN ORGANIZED HEALTH CARE EDUCATION/TRAINING PROGRAM

## 2024-11-20 PROCEDURE — 85027 COMPLETE CBC AUTOMATED: CPT | Performed by: STUDENT IN AN ORGANIZED HEALTH CARE EDUCATION/TRAINING PROGRAM

## 2024-11-20 PROCEDURE — 82947 ASSAY GLUCOSE BLOOD QUANT: CPT

## 2024-11-20 PROCEDURE — 83735 ASSAY OF MAGNESIUM: CPT | Performed by: STUDENT IN AN ORGANIZED HEALTH CARE EDUCATION/TRAINING PROGRAM

## 2024-11-20 PROCEDURE — 80069 RENAL FUNCTION PANEL: CPT | Performed by: STUDENT IN AN ORGANIZED HEALTH CARE EDUCATION/TRAINING PROGRAM

## 2024-11-20 PROCEDURE — 2500000002 HC RX 250 W HCPCS SELF ADMINISTERED DRUGS (ALT 637 FOR MEDICARE OP, ALT 636 FOR OP/ED): Performed by: STUDENT IN AN ORGANIZED HEALTH CARE EDUCATION/TRAINING PROGRAM

## 2024-11-20 PROCEDURE — 2500000001 HC RX 250 WO HCPCS SELF ADMINISTERED DRUGS (ALT 637 FOR MEDICARE OP): Performed by: STUDENT IN AN ORGANIZED HEALTH CARE EDUCATION/TRAINING PROGRAM

## 2024-11-20 PROCEDURE — 1170000001 HC PRIVATE ONCOLOGY ROOM DAILY

## 2024-11-20 PROCEDURE — 97530 THERAPEUTIC ACTIVITIES: CPT | Mod: GP | Performed by: PHYSICAL THERAPIST

## 2024-11-20 PROCEDURE — 36415 COLL VENOUS BLD VENIPUNCTURE: CPT | Performed by: STUDENT IN AN ORGANIZED HEALTH CARE EDUCATION/TRAINING PROGRAM

## 2024-11-20 PROCEDURE — RXMED WILLOW AMBULATORY MEDICATION CHARGE

## 2024-11-20 RX ORDER — OXYCODONE HYDROCHLORIDE 5 MG/1
5 TABLET ORAL EVERY 4 HOURS PRN
Qty: 15 TABLET | Refills: 0 | Status: SHIPPED | OUTPATIENT
Start: 2024-11-20 | End: 2024-11-20 | Stop reason: HOSPADM

## 2024-11-20 RX ORDER — METHOCARBAMOL 500 MG/1
500 TABLET, FILM COATED ORAL 4 TIMES DAILY PRN
Qty: 60 TABLET | Refills: 1 | Status: SHIPPED | OUTPATIENT
Start: 2024-11-20 | End: 2024-12-20

## 2024-11-20 RX ORDER — PETROLATUM 420 MG/G
OINTMENT TOPICAL
Status: DISCONTINUED | OUTPATIENT
Start: 2024-11-20 | End: 2024-11-23 | Stop reason: HOSPADM

## 2024-11-20 RX ORDER — ONDANSETRON 4 MG/1
4 TABLET, FILM COATED ORAL EVERY 8 HOURS PRN
Qty: 30 TABLET | Refills: 0 | Status: SHIPPED | OUTPATIENT
Start: 2024-11-20 | End: 2024-12-20

## 2024-11-20 RX ORDER — AMOXICILLIN 250 MG
2 CAPSULE ORAL 2 TIMES DAILY
Qty: 120 TABLET | Refills: 0 | Status: SHIPPED | OUTPATIENT
Start: 2024-11-20 | End: 2024-12-20

## 2024-11-20 RX ORDER — METHOCARBAMOL 100 MG/ML
500 INJECTION, SOLUTION INTRAMUSCULAR; INTRAVENOUS EVERY 8 HOURS PRN
Status: DISCONTINUED | OUTPATIENT
Start: 2024-11-20 | End: 2024-11-23 | Stop reason: HOSPADM

## 2024-11-20 RX ORDER — CIPROFLOXACIN 500 MG/1
500 TABLET ORAL 2 TIMES DAILY
Qty: 14 TABLET | Refills: 0 | Status: SHIPPED | OUTPATIENT
Start: 2024-11-20 | End: 2024-11-27

## 2024-11-20 RX ORDER — OXYCODONE HYDROCHLORIDE 5 MG/1
5 TABLET ORAL EVERY 6 HOURS PRN
Qty: 20 TABLET | Refills: 0 | Status: SHIPPED | OUTPATIENT
Start: 2024-11-20

## 2024-11-20 RX ORDER — MUPIROCIN 20 MG/G
OINTMENT TOPICAL 2 TIMES DAILY
Qty: 22 G | Refills: 1 | Status: SHIPPED | OUTPATIENT
Start: 2024-11-20 | End: 2024-11-27

## 2024-11-20 RX ORDER — ACETAMINOPHEN 325 MG/1
650 TABLET ORAL EVERY 6 HOURS
Start: 2024-11-20

## 2024-11-20 ASSESSMENT — ACTIVITIES OF DAILY LIVING (ADL): LACK_OF_TRANSPORTATION: NO

## 2024-11-20 ASSESSMENT — COGNITIVE AND FUNCTIONAL STATUS - GENERAL
MOBILITY SCORE: 24
MOBILITY SCORE: 18
TURNING FROM BACK TO SIDE WHILE IN FLAT BAD: A LITTLE
DAILY ACTIVITIY SCORE: 24
DAILY ACTIVITIY SCORE: 24
MOVING FROM LYING ON BACK TO SITTING ON SIDE OF FLAT BED WITH BEDRAILS: A LITTLE
WALKING IN HOSPITAL ROOM: A LITTLE
MOVING TO AND FROM BED TO CHAIR: A LITTLE
STANDING UP FROM CHAIR USING ARMS: A LITTLE
MOBILITY SCORE: 24
CLIMB 3 TO 5 STEPS WITH RAILING: A LITTLE

## 2024-11-20 ASSESSMENT — PAIN SCALES - GENERAL
PAINLEVEL_OUTOF10: 8
PAINLEVEL_OUTOF10: 8
PAINLEVEL_OUTOF10: 7

## 2024-11-20 NOTE — PROGRESS NOTES
"Juana Motley is a 49 y.o. female on day 2 of admission presenting with Conductive hearing loss of right ear with unrestricted hearing of left ear.    Subjective   No acute events overnight       Objective     Physical Exam  Aox3  FS  BUE / BLE prox 5 dist 5  LD site cdi  Dressing in place    Last Recorded Vitals  Blood pressure 113/72, pulse 58, temperature 36.8 °C (98.2 °F), temperature source Temporal, resp. rate 18, height 1.676 m (5' 6\"), weight 91.5 kg (201 lb 11.5 oz), SpO2 93%.  Intake/Output last 3 Shifts:  I/O last 3 completed shifts:  In: 5058.8 (55.3 mL/kg) [P.O.:1490; I.V.:1468.8 (16.1 mL/kg); IV Piggyback:2100]  Out: 2995 (32.7 mL/kg) [Urine:2775 (0.8 mL/kg/hr)]  Weight: 91.5 kg     Relevant Results                 CTH POC         Assessment/Plan   Assessment & Plan  Conductive hearing loss of right ear with unrestricted hearing of left ear    Encephalocele    History of bacterial meningitis    49F h/o ADHD, anxiety, bipolar I, DMII, JUAN (no CPAP), CAD on asa, p/w otorrhea, CTH/MRI R tegmen defect with encephalocele, 11/18 s/p R MCF, LD placement    Recs:  ENT primary  LD at 10/hr - clamp 11/20 PM  Abx /dex per ENT  Ok for chemo ppx POD2   Will continue to follow           Shakira Gibbs MD      "

## 2024-11-20 NOTE — PROGRESS NOTES
11/20/24 1000   Discharge Planning   Living Arrangements Spouse/significant other   Support Systems Spouse/significant other   Assistance Needed none   Type of Residence Private residence   Who is requesting discharge planning? Provider   Home or Post Acute Services None   Expected Discharge Disposition Home   Does the patient need discharge transport arranged? No   Financial Resource Strain   How hard is it for you to pay for the very basics like food, housing, medical care, and heating? Not very   Housing Stability   In the last 12 months, was there a time when you were not able to pay the mortgage or rent on time? N   In the past 12 months, how many times have you moved where you were living? 0   At any time in the past 12 months, were you homeless or living in a shelter (including now)? N   Transportation Needs   In the past 12 months, has lack of transportation kept you from medical appointments or from getting medications? no   In the past 12 months, has lack of transportation kept you from meetings, work, or from getting things needed for daily living? No     TCC met with patient at bedside to discuss anticipated discharge needs. Demographics and insurance verified with patient. Patient lives at home with spouse and was independent with ADLs prior to admission. Patient declines home health for PT at this time and states she has gone outpatient for PT and OT prior. Will continue to follow patient for discharge needs.  Alisha Brewster RN TCC

## 2024-11-20 NOTE — PROGRESS NOTES
Physical Therapy    Physical Therapy Treatment    Patient Name: Juana Motley  MRN: [id number]  Department: [facility]  Room: [address] [place] [address]  Today's Date: 11/20/2024  Time Calculation  Start Time: 1426  Stop Time: 1438  Time Calculation (min): 12 min         Assessment/Plan   PT Assessment  PT Assessment Results: Decreased strength, Impaired balance, Decreased mobility  Rehab Prognosis: Good  End of Session Communication: Bedside nurse  End of Session Patient Position: Bed, 3 rail up, Alarm off, not on at start of session  PT Plan  Inpatient/Swing Bed or Outpatient: Inpatient  PT Plan  Treatment/Interventions: Bed mobility, Transfer training, Gait training, Stair training, Balance training, Strengthening, Endurance training, Therapeutic exercise, Therapeutic activity  PT Plan: Ongoing PT  PT Frequency: 4 times per week  PT Discharge Recommendations: Low intensity level of continued care  PT Recommended Transfer Status: Assist x1  PT - OK to Discharge: Yes      General Visit Information:   PT  Visit  PT Received On: 11/20/24  General  Reason for Referral: presents with otorrhea; CTH/MRI-> R tegmen defect with encephalocele; s/p R MCF, LD placement  Past Medical History Relevant to Rehab: ADHD, anxiety, bipolar disorder, T2DM, JUAN, CAD on ASA; denied falls/6  months  Family/Caregiver Present: Yes  Caregiver Feedback: spouse present and was very supportive  Patient Position Received: Bed, 3 rail up, Alarm off, not on at start of session  Preferred Learning Style: verbal  General Comment: Pt supine in bed upon arrival, pleasant and agreeable to participate. Pt reported she walked before and agreeable to ambulate in room    Subjective   Precautions:  Precautions  Medical Precautions: Fall precautions  Precautions Comment: per RN, SBP <140 goal    Vital Signs (Past 2hrs)        Date/Time Vitals Session Patient Position Pulse Resp SpO2 BP MAP (mmHg)    11/20/24 1334 --  --  71  16  98 %  132/78  --     11/20/24 1426 During PT   --  68  --  97 %  128/61  --                         Objective   Cognition:  Cognition  Overall Cognitive Status: Within Functional Limits  Orientation Level: Oriented X4    Treatments:    Bed Mobility 1  Bed Mobility 1: Supine to sitting, Sitting to supine  Level of Assistance 1: Close supervision  Bed Mobility Comments 1: HOB elevated    Ambulation/Gait Training  Ambulation/Gait Training Performed: Yes  Ambulation/Gait Training 1  Surface 1: Level tile  Device 1: No device  Assistance 1: Close supervision, Minimal verbal cues  Quality of Gait 1: Decreased step length  Comments/Distance (ft) 1: ~50 ft  Transfers  Transfer: Yes  Transfer 1  Transfer From 1: Sit to, Stand to  Transfer to 1: Sit, Stand  Technique 1: Stand to sit, Sit to stand  Transfer Level of Assistance 1: Close supervision, Minimal verbal cues    Outcome Measures:  [facility] Basic Mobility  Turning from your back to your side while in a flat bed without using bedrails: A little  Moving from lying on your back to sitting on the side of a flat bed without using bedrails: A little  Moving to and from bed to chair (including a wheelchair): A little  Standing up from a chair using your arms (e.g. wheelchair or bedside chair): A little  To walk in hospital room: A little  Climbing 3-5 steps with railing: A little  Basic Mobility - Total Score: 18    Education Documentation  Precautions, taught by Rosibel Mei PT at 11/20/2024  3:09 PM.  Learner: Patient  Readiness: Acceptance  Method: Explanation  Response: Verbalizes Understanding, Needs Reinforcement    Mobility Training, taught by Rosibel Mei PT at 11/20/2024  3:09 PM.  Learner: Patient  Readiness: Acceptance  Method: Explanation  Response: Verbalizes Understanding, Needs Reinforcement    Education Comments  No comments found.        OP EDUCATION:       Encounter Problems       Encounter Problems (Active)       PT Problem       Patient will complete bed mobility with independence  (Progressing)       Start:   11/19/24    Expected End:  12/03/24            Patient will complete STS with independence using No Device without acute LOB   (Progressing)       Start:  11/19/24    Expected End:  12/03/24            Patient will ambulate >/=200' with No Device with independence without acute LOB, or postural sway  (Progressing)       Start:  11/19/24    Expected End:  12/03/24            Patient will ascend/descend 12 steps with x1 handrail and independence without acute LOB.    (Progressing)       Start:  11/19/24    Expected End:  12/03/24            Patient will independently complete standing dynamic balance activities without acute LOB, while maintaining midline posture.  (Progressing)       Start:  11/19/24    Expected End:  12/03/24               Pain - Adult

## 2024-11-20 NOTE — DISCHARGE INSTRUCTIONS
You may resume use of your aspirin on 12/2/2025.  Most ear surgeries should have a 2-4 week postoperative appointment. Please be sure to call the doctor's office and make a follow-up appointment, if you don't already have it.  Clean the incision by your ear twice daily with soap and water and apply Vaseline or antibiotic ointment after cleaning.   You may shower and shampoo the hair. Apply ointment to your incision and do not scrub at your incision or soak it under water. Okay for mild soapy water to run over the right ear and incision.    Bloody discharge from incision area may occur during the first 10 days following surgery.  If this persists or increases, please call the office.  A full sensation with popping sounds may be noticed during the healing process.  DO NOT BLOW YOUR NOSE FOR THREE WEEKS FOLLOWING SURGERY. If you sneeze, do so with your mouth open for three weeks following surgery. Do not use a straw to drink beverages for 3 weeks following surgery.  Do not use Q-Tips or put anything in the canal, until approved by your doctor.  Do not be concerned regarding your hearing for a period of six to eight weeks following surgery.  Your hearing will be evaluated at this time; until then, your hearing may sound muffled and your voice may echo in your ear during speech.  Minor swelling and/or bruising of the face on the same side of the surgery is not uncommon.  Small bruising near the eye or mouth is not uncommon from the facial nerve monitor.  Dizziness, ringing in the ear, and taste disturbance after surgery are common. Call if severe.   You might notice pain when chewing, please use soft diet for 2 weeks if you experience this.  No lifting (more than 10 lbs) or straining until follow up.  You will be discharged on pain medications and usually antibiotics.  You may resume your routine medications as directed, unless you have been instructed otherwise by the prescribing healthcare provider.  Should you  experience any difficulty upon returning home, or if you simply have questions, please contact us.  As your surgeons, we are most familiar with your operation and postoperative procedures.  We are accessible by telephone 24 hours a day, 7 days a week.  Once we have assessed your situation, we will be prepared to make specific suggestions for your care.

## 2024-11-21 ENCOUNTER — APPOINTMENT (OUTPATIENT)
Dept: RADIOLOGY | Facility: HOSPITAL | Age: 49
DRG: 026 | End: 2024-11-21
Payer: COMMERCIAL

## 2024-11-21 LAB
ALBUMIN SERPL BCP-MCNC: 3.7 G/DL (ref 3.4–5)
ANION GAP SERPL CALC-SCNC: 14 MMOL/L (ref 10–20)
BUN SERPL-MCNC: 15 MG/DL (ref 6–23)
CALCIUM SERPL-MCNC: 8.5 MG/DL (ref 8.6–10.6)
CHLORIDE SERPL-SCNC: 102 MMOL/L (ref 98–107)
CO2 SERPL-SCNC: 29 MMOL/L (ref 21–32)
CREAT SERPL-MCNC: 0.78 MG/DL (ref 0.5–1.05)
EGFRCR SERPLBLD CKD-EPI 2021: >90 ML/MIN/1.73M*2
ERYTHROCYTE [DISTWIDTH] IN BLOOD BY AUTOMATED COUNT: 13.2 % (ref 11.5–14.5)
GLUCOSE BLD MANUAL STRIP-MCNC: 103 MG/DL (ref 74–99)
GLUCOSE BLD MANUAL STRIP-MCNC: 146 MG/DL (ref 74–99)
GLUCOSE BLD MANUAL STRIP-MCNC: 148 MG/DL (ref 74–99)
GLUCOSE SERPL-MCNC: 98 MG/DL (ref 74–99)
HCT VFR BLD AUTO: 38.1 % (ref 36–46)
HGB BLD-MCNC: 12.3 G/DL (ref 12–16)
MAGNESIUM SERPL-MCNC: 1.74 MG/DL (ref 1.6–2.4)
MCH RBC QN AUTO: 31 PG (ref 26–34)
MCHC RBC AUTO-ENTMCNC: 32.3 G/DL (ref 32–36)
MCV RBC AUTO: 96 FL (ref 80–100)
NRBC BLD-RTO: 0 /100 WBCS (ref 0–0)
PHOSPHATE SERPL-MCNC: 2.5 MG/DL (ref 2.5–4.9)
PLATELET # BLD AUTO: 239 X10*3/UL (ref 150–450)
POTASSIUM SERPL-SCNC: 3.6 MMOL/L (ref 3.5–5.3)
RBC # BLD AUTO: 3.97 X10*6/UL (ref 4–5.2)
SODIUM SERPL-SCNC: 141 MMOL/L (ref 136–145)
VANCOMYCIN SERPL-MCNC: 17.5 UG/ML (ref 5–20)
WBC # BLD AUTO: 12.2 X10*3/UL (ref 4.4–11.3)

## 2024-11-21 PROCEDURE — 2500000004 HC RX 250 GENERAL PHARMACY W/ HCPCS (ALT 636 FOR OP/ED): Performed by: STUDENT IN AN ORGANIZED HEALTH CARE EDUCATION/TRAINING PROGRAM

## 2024-11-21 PROCEDURE — 85027 COMPLETE CBC AUTOMATED: CPT | Performed by: STUDENT IN AN ORGANIZED HEALTH CARE EDUCATION/TRAINING PROGRAM

## 2024-11-21 PROCEDURE — 97530 THERAPEUTIC ACTIVITIES: CPT | Mod: GP | Performed by: PHYSICAL THERAPIST

## 2024-11-21 PROCEDURE — 80202 ASSAY OF VANCOMYCIN: CPT | Performed by: STUDENT IN AN ORGANIZED HEALTH CARE EDUCATION/TRAINING PROGRAM

## 2024-11-21 PROCEDURE — 70450 CT HEAD/BRAIN W/O DYE: CPT | Performed by: RADIOLOGY

## 2024-11-21 PROCEDURE — 80069 RENAL FUNCTION PANEL: CPT | Performed by: STUDENT IN AN ORGANIZED HEALTH CARE EDUCATION/TRAINING PROGRAM

## 2024-11-21 PROCEDURE — 1170000001 HC PRIVATE ONCOLOGY ROOM DAILY

## 2024-11-21 PROCEDURE — 83735 ASSAY OF MAGNESIUM: CPT | Performed by: STUDENT IN AN ORGANIZED HEALTH CARE EDUCATION/TRAINING PROGRAM

## 2024-11-21 PROCEDURE — 70450 CT HEAD/BRAIN W/O DYE: CPT

## 2024-11-21 PROCEDURE — 36415 COLL VENOUS BLD VENIPUNCTURE: CPT | Performed by: STUDENT IN AN ORGANIZED HEALTH CARE EDUCATION/TRAINING PROGRAM

## 2024-11-21 PROCEDURE — 2500000001 HC RX 250 WO HCPCS SELF ADMINISTERED DRUGS (ALT 637 FOR MEDICARE OP): Performed by: STUDENT IN AN ORGANIZED HEALTH CARE EDUCATION/TRAINING PROGRAM

## 2024-11-21 PROCEDURE — 2500000002 HC RX 250 W HCPCS SELF ADMINISTERED DRUGS (ALT 637 FOR MEDICARE OP, ALT 636 FOR OP/ED): Performed by: STUDENT IN AN ORGANIZED HEALTH CARE EDUCATION/TRAINING PROGRAM

## 2024-11-21 PROCEDURE — 2500000004 HC RX 250 GENERAL PHARMACY W/ HCPCS (ALT 636 FOR OP/ED)

## 2024-11-21 PROCEDURE — 82947 ASSAY GLUCOSE BLOOD QUANT: CPT

## 2024-11-21 RX ORDER — ACETAZOLAMIDE 250 MG/1
125 TABLET ORAL 2 TIMES DAILY
Status: DISCONTINUED | OUTPATIENT
Start: 2024-11-21 | End: 2024-11-23 | Stop reason: HOSPADM

## 2024-11-21 RX ORDER — POTASSIUM CHLORIDE 20 MEQ/1
20 TABLET, EXTENDED RELEASE ORAL ONCE
Status: DISCONTINUED | OUTPATIENT
Start: 2024-11-21 | End: 2024-11-23 | Stop reason: HOSPADM

## 2024-11-21 RX ORDER — SODIUM,POTASSIUM PHOSPHATES 280-250MG
1 POWDER IN PACKET (EA) ORAL 4 TIMES DAILY
Status: COMPLETED | OUTPATIENT
Start: 2024-11-21 | End: 2024-11-21

## 2024-11-21 RX ORDER — MAGNESIUM SULFATE HEPTAHYDRATE 40 MG/ML
2 INJECTION, SOLUTION INTRAVENOUS ONCE
Status: COMPLETED | OUTPATIENT
Start: 2024-11-21 | End: 2024-11-21

## 2024-11-21 ASSESSMENT — COGNITIVE AND FUNCTIONAL STATUS - GENERAL
CLIMB 3 TO 5 STEPS WITH RAILING: A LITTLE
MOVING FROM LYING ON BACK TO SITTING ON SIDE OF FLAT BED WITH BEDRAILS: A LITTLE
MOVING TO AND FROM BED TO CHAIR: A LITTLE
WALKING IN HOSPITAL ROOM: A LITTLE
TURNING FROM BACK TO SIDE WHILE IN FLAT BAD: A LITTLE
STANDING UP FROM CHAIR USING ARMS: A LITTLE
MOBILITY SCORE: 18

## 2024-11-21 ASSESSMENT — PAIN SCALES - GENERAL
PAINLEVEL_OUTOF10: 8
PAINLEVEL_OUTOF10: 9
PAINLEVEL_OUTOF10: 7
PAINLEVEL_OUTOF10: 8
PAINLEVEL_OUTOF10: 0 - NO PAIN

## 2024-11-21 ASSESSMENT — PAIN DESCRIPTION - ORIENTATION: ORIENTATION: RIGHT

## 2024-11-21 ASSESSMENT — PAIN - FUNCTIONAL ASSESSMENT: PAIN_FUNCTIONAL_ASSESSMENT: 0-10

## 2024-11-21 ASSESSMENT — PAIN DESCRIPTION - LOCATION: LOCATION: EAR

## 2024-11-21 NOTE — PROGRESS NOTES
"Juana Motley is a 49 y.o. female on day 3 of admission presenting with Conductive hearing loss of right ear with unrestricted hearing of left ear.       Subjective   LD clamped at 5pm yesterday followed by significant pain and pressure sensation to the top of her head  overnight. NSGY evaluated and unclamped LD at 4:30 AM. On rounds at 5:45 AM she is still in 8/10 pain to the top of her head.     Objective     Physical Exam  Non labored breathing  No Acute distress  Normal heart rate  Jose down with pinpoint very slow blood oozing to the middle of her incision. Incision well approximated without breakdown.   HB 1/6  Pastor to the left, AC>BC on the left, AC>BC in the right  A&Ox3      Last Recorded Vitals  Blood pressure 138/69, pulse 72, temperature 36.6 °C (97.9 °F), temperature source Temporal, resp. rate 18, height 1.676 m (5' 6\"), weight 91.5 kg (201 lb 11.5 oz), SpO2 98%.  Intake/Output last 3 Shifts:  I/O last 3 completed shifts:  In: 3260 (35.6 mL/kg) [P.O.:2160; IV Piggyback:1100]  Out: 330 (3.6 mL/kg)   Weight: 91.5 kg     Relevant Results        Scheduled medications  acetaminophen, 650 mg, oral, q6h  atomoxetine, 25 mg, oral, q AM  cefTRIAXone, 2 g, intravenous, q12h  fLuvoxaMINE, 50 mg, oral, Nightly  heparin (porcine), 5,000 Units, subcutaneous, q8h  hydroCHLOROthiazide, 12.5 mg, oral, Daily  insulin lispro, 0-5 Units, subcutaneous, TID AC  levothyroxine, 100 mcg, oral, Nightly  pantoprazole, 40 mg, oral, Daily before breakfast   Or  pantoprazole, 40 mg, intravenous, Daily before breakfast  rosuvastatin, 5 mg, oral, Daily  sennosides-docusate sodium, 2 tablet, oral, BID  spironolactone, 25 mg, oral, Daily  vancomycin, 1,500 mg, intravenous, q12h      Continuous medications     PRN medications  PRN medications: benzocaine-menthol, clonazePAM, dextrose, dextrose, glucagon, glucagon, methocarbamol, naloxone, ondansetron ODT **OR** ondansetron, oxyCODONE, oxyCODONE, oxygen, vancomycin, white " petrolatum                         Assessment/Plan   Assessment & Plan  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 Tao and Dr. Pedro.    -Drains: LD at 10/hr - clamped 11/20 PM per NSGY, unclamped 11/21 due to pain  -Analgesia:  Scheduled Acetaminophen, Oxycodone 5 and 10mg prn  -Neuro: Home Atomoxetine, Fluvoxamine, Klonopin prn. Follow up postop CTH for pain 11/21  -FEN: Soft diet, monitor and replete electrolytes as needed  -Pulm: Non-labored breathing; incentive spirometer 10x/hr while awake  -ID: On Vanc/Ceftriaxone; will transition to PO abx on discharge  -Cardiac: On home hydrochlorothiazide, Spironolactone  -Heme: Monitor H/H, daily CBC  -Endo: Insulin sliding scale & blood glucose POCT Q6hrs x 3days  -GI: Bowel regimen, PPI,  and PRN anti-emetic  -: Tate dc'd 11/19. Voiding spontaneously  -Steroids/Special: On Decadron 8q8 to end 11/20  -Embolic PPx: SQH, SCDs while in bed. NSGY recs rs ASA POD 14  -Dispo: On RNF, dispo pending lumbar drain removal and stability of pain and results of CTH.            Toney Peña MD

## 2024-11-21 NOTE — PROGRESS NOTES
Vancomycin Dosing by Pharmacy- FOLLOW UP    Juana Motley is a 49 y.o. year old female who Pharmacy has been consulted for vancomycin dosing for CNS/meningoencephalitis. Based on the patient's indication and renal status this patient is being dosed based on a goal AUC of 500-600.     Renal function is currently stable. But scr has been increasing over past few days.     Current vancomycin dose: 1500 mg given every 12 hours    Most recent random level: 17.5 mcg/mL    Visit Vitals  /84 (BP Location: Right arm, Patient Position: Sitting)   Pulse 55   Temp 36.3 °C (97.3 °F) (Temporal)   Resp 16        Lab Results   Component Value Date    CREATININE 0.78 2024    CREATININE 0.64 2024    CREATININE 0.57 2024    CREATININE 0.69 2024        Patient weight is as follows:   Vitals:    24 1246   Weight: 91.5 kg (201 lb 11.5 oz)       Cultures:  No results found for the encounter in last 14 days.       I/O last 3 completed shifts:  In: 1570 (17.2 mL/kg) [P.O.:1020; IV Piggyback:550]  Out: 240 (2.6 mL/kg)   Weight: 91.5 kg   I/O during current shift:  I/O this shift:  In: -   Out: 20     Temp (24hrs), Av.4 °C (97.6 °F), Min:35.9 °C (96.6 °F), Max:36.8 °C (98.2 °F)      Assessment/Plan    Within goal AUC range. Continue current vancomycin regimen.    This dosing regimen is predicted by [employer] to result in the following pharmacokinetic parameters:    Loading dose: N/A  Regimen: 1500 mg IV every 12 hours.  Start time: 10:34 on 2024  Exposure target: AUC24 (range)400-600 mg/L.hr   [id number]: 519 mg/L.hr  AUC24,ss: 527 mg/L.hr  Probability of AUC24 > 400: 98 %  Ctrough,ss: 15.1 mg/L  Probability of Ctrough,ss > 20: 8 %      The next level will be obtained on  with AM labs. May be obtained sooner if clinically indicated.   Will continue to monitor renal function daily while on vancomycin and order serum creatinine at least every 48 hours if not already ordered.  Follow for  continued vancomycin needs, clinical response, and signs/symptoms of toxicity.       HELIO SWENSON

## 2024-11-21 NOTE — PROGRESS NOTES
Physical Therapy    Physical Therapy Treatment    Patient Name: Juana Motley  MRN: [id number]  Department: [facility]  Room: [facility]-A  Today's Date: 11/21/2024  Time Calculation  Start Time: 1615  Stop Time: 1630  Time Calculation (min): 15 min         Assessment/Plan   PT Assessment  PT Assessment Results: Decreased strength, Impaired balance, Decreased mobility  Rehab Prognosis: Good  End of Session Communication: Bedside nurse  End of Session Patient Position: Bed, 3 rail up, Alarm off, not on at start of session  PT Plan  Inpatient/Swing Bed or Outpatient: Inpatient  PT Plan  Treatment/Interventions: Bed mobility, Transfer training, Gait training, Stair training, Strengthening, Endurance training, Therapeutic activity, Therapeutic exercise  PT Plan: Ongoing PT  PT Frequency: 4 times per week  PT Discharge Recommendations: Low intensity level of continued care  PT Recommended Transfer Status: Assist x1  PT - OK to Discharge: Yes      General Visit Information:   PT  Visit  PT Received On: 11/21/24  General  Reason for Referral: presents with otorrhea; CTH/MRI-> R tegmen defect with encephalocele; s/p R MCF, LD placement  Past Medical History Relevant to Rehab: ADHD, anxiety, bipolar disorder, T2DM, JUAN, CAD on ASA; denied falls/6  months  Family/Caregiver Present: Yes  Caregiver Feedback: spouse present and was very supportive  Patient Position Received: Bed, 3 rail up, Alarm off, not on at start of session  Preferred Learning Style: verbal  General Comment: Pt standing upon arrival, pleasant and willing to participate.    Subjective   Precautions:  Precautions  Medical Precautions: Fall precautions  Precautions Comment: per RN, SBP <140 goal    Vital Signs (Past 2hrs)        Date/Time Vitals Session Patient Position Pulse Resp SpO2 BP MAP (mmHg)               11/21/24 1615 During PT  --  62  --  97 %  117/71  --                Objective   Pain:  Pain Assessment  Pain Assessment: 0-10  0-10 (Numeric) Pain Score:  0 - No pain (pre-medicated for pain)  Cognition:  Cognition  Overall Cognitive Status: Within Functional Limits  Orientation Level: Oriented X4       Treatments:     Bed Mobility  Bed Mobility: Yes  Bed Mobility 1  Bed Mobility 1: Sitting to supine  Level of Assistance 1:  (supervision)  Bed Mobility Comments 1: HOB elevated    Ambulation/Gait Training  Ambulation/Gait Training Performed: Yes  Ambulation/Gait Training 1  Surface 1: Level tile  Device 1: No device  Assistance 1: Close supervision, Minimal verbal cues  Quality of Gait 1: Decreased step length  Comments/Distance (ft) 1: ~150 ft  Transfers  Transfer: Yes  Transfer 1  Transfer From 1: Sit to, Stand to  Transfer to 1: Sit, Stand  Technique 1: Stand to sit, Sit to stand  Transfer Level of Assistance 1: Close supervision, Minimal verbal cues    Stairs  Stairs: No (Agreeable for next session, able to complete B LE alternating marches x4 with no UE support andCGA)    Outcome Measures:  [facility] Basic Mobility  Turning from your back to your side while in a flat bed without using bedrails: A little  Moving from lying on your back to sitting on the side of a flat bed without using bedrails: A little  Moving to and from bed to chair (including a wheelchair): A little  Standing up from a chair using your arms (e.g. wheelchair or bedside chair): A little  To walk in hospital room: A little  Climbing 3-5 steps with railing: A little  Basic Mobility - Total Score: 18    Education Documentation  Precautions, taught by Rosibel Mei PT at 11/21/2024  4:43 PM.  Learner: Patient  Readiness: Acceptance  Method: Explanation  Response: Verbalizes Understanding, Needs Reinforcement    Mobility Training, taught by Rosibel Mei PT at 11/21/2024  4:43 PM.  Learner: Patient  Readiness: Acceptance  Method: Explanation  Response: Verbalizes Understanding, Needs Reinforcement    Education Comments  No comments found.        OP EDUCATION:       Encounter Problems       Encounter Problems  (Active)       PT Problem       Patient will complete bed mobility with independence  (Progressing)       Start:  11/19/24    Expected End:  12/03/24            Patient will complete STS with independence using No Device without acute LOB   (Progressing)       Start:  11/19/24    Expected End:  12/03/24            Patient will ambulate >/=200' with No Device with independence without acute LOB, or postural sway  (Progressing)       Start:  11/19/24    Expected End:  12/03/24            Patient will ascend/descend 12 steps with x1 handrail and independence without acute LOB.    (Progressing)       Start:  11/19/24    Expected End:  12/03/24            Patient will independently complete standing dynamic balance activities without acute LOB, while maintaining midline posture.  (Progressing)       Start:  11/19/24    Expected End:  12/03/24               Pain - Adult

## 2024-11-21 NOTE — PROGRESS NOTES
"Juana Motley is a 49 y.o. female on day 3 of admission presenting with Conductive hearing loss of right ear with unrestricted hearing of left ear.    Subjective   Patient with increased feeling of fullness on right side of her head and behind her eyes . Feels it slowly built up over couple of hours since LD drain was clamped.  Feels the pressure is very similar to how she felt prior to surgery.        Objective     Physical Exam  Aox3  FS  BUE / BLE prox 5 dist 5  LD site cdi  Dressing in place    Last Recorded Vitals  Blood pressure 138/69, pulse 72, temperature 36.6 °C (97.9 °F), temperature source Temporal, resp. rate 18, height 1.676 m (5' 6\"), weight 91.5 kg (201 lb 11.5 oz), SpO2 98%.  Intake/Output last 3 Shifts:  I/O last 3 completed shifts:  In: 3260 (35.6 mL/kg) [P.O.:2160; IV Piggyback:1100]  Out: 330 (3.6 mL/kg)   Weight: 91.5 kg     Relevant Results                 CTH POC         Assessment/Plan   Assessment & Plan  Conductive hearing loss of right ear with unrestricted hearing of left ear    Encephalocele    History of bacterial meningitis    49F h/o ADHD, anxiety, bipolar I, DMII, JUAN (no CPAP), CAD on asa, p/w otorrhea, CTH/MRI R tegmen defect with encephalocele, 11/18 s/p R MCF, LD placement    11/21 incr pressure in head (presenting sxs per patient), LD re-opened     Recs:  ENT primary  LD re-opened, will discuss with ENT re-clamping pending symptoms progression/ vs resolution   Consider CTH non con pending sxs  Abx /dex per ENT  Ok for chemo ppx POD2     Will continue to follow           Shakira Gibbs MD      "

## 2024-11-22 LAB
GLUCOSE BLD MANUAL STRIP-MCNC: 112 MG/DL (ref 74–99)
GLUCOSE BLD MANUAL STRIP-MCNC: 125 MG/DL (ref 74–99)
GLUCOSE BLD MANUAL STRIP-MCNC: 81 MG/DL (ref 74–99)
VANCOMYCIN SERPL-MCNC: 21.7 UG/ML (ref 5–20)

## 2024-11-22 PROCEDURE — 2500000001 HC RX 250 WO HCPCS SELF ADMINISTERED DRUGS (ALT 637 FOR MEDICARE OP): Performed by: STUDENT IN AN ORGANIZED HEALTH CARE EDUCATION/TRAINING PROGRAM

## 2024-11-22 PROCEDURE — 82947 ASSAY GLUCOSE BLOOD QUANT: CPT

## 2024-11-22 PROCEDURE — 97116 GAIT TRAINING THERAPY: CPT | Mod: GP,CQ

## 2024-11-22 PROCEDURE — 2500000004 HC RX 250 GENERAL PHARMACY W/ HCPCS (ALT 636 FOR OP/ED): Performed by: STUDENT IN AN ORGANIZED HEALTH CARE EDUCATION/TRAINING PROGRAM

## 2024-11-22 PROCEDURE — 36415 COLL VENOUS BLD VENIPUNCTURE: CPT

## 2024-11-22 PROCEDURE — 2500000002 HC RX 250 W HCPCS SELF ADMINISTERED DRUGS (ALT 637 FOR MEDICARE OP, ALT 636 FOR OP/ED): Performed by: STUDENT IN AN ORGANIZED HEALTH CARE EDUCATION/TRAINING PROGRAM

## 2024-11-22 PROCEDURE — 2500000004 HC RX 250 GENERAL PHARMACY W/ HCPCS (ALT 636 FOR OP/ED)

## 2024-11-22 PROCEDURE — 80202 ASSAY OF VANCOMYCIN: CPT

## 2024-11-22 PROCEDURE — 1170000001 HC PRIVATE ONCOLOGY ROOM DAILY

## 2024-11-22 PROCEDURE — 97530 THERAPEUTIC ACTIVITIES: CPT | Mod: GP,CQ

## 2024-11-22 RX ORDER — POLYETHYLENE GLYCOL 3350 17 G/17G
17 POWDER, FOR SOLUTION ORAL DAILY
Status: DISCONTINUED | OUTPATIENT
Start: 2024-11-22 | End: 2024-11-23 | Stop reason: HOSPADM

## 2024-11-22 ASSESSMENT — COGNITIVE AND FUNCTIONAL STATUS - GENERAL
MOBILITY SCORE: 18
TURNING FROM BACK TO SIDE WHILE IN FLAT BAD: A LITTLE
MOBILITY SCORE: 24
CLIMB 3 TO 5 STEPS WITH RAILING: A LITTLE
WALKING IN HOSPITAL ROOM: A LITTLE
MOVING FROM LYING ON BACK TO SITTING ON SIDE OF FLAT BED WITH BEDRAILS: A LITTLE
DAILY ACTIVITIY SCORE: 24
MOVING TO AND FROM BED TO CHAIR: A LITTLE
STANDING UP FROM CHAIR USING ARMS: A LITTLE

## 2024-11-22 ASSESSMENT — PAIN - FUNCTIONAL ASSESSMENT: PAIN_FUNCTIONAL_ASSESSMENT: 0-10

## 2024-11-22 ASSESSMENT — PAIN SCALES - GENERAL
PAINLEVEL_OUTOF10: 0 - NO PAIN
PAINLEVEL_OUTOF10: 1
PAINLEVEL_OUTOF10: 7
PAINLEVEL_OUTOF10: 7

## 2024-11-22 NOTE — ASSESSMENT & PLAN NOTE
48y/o female with history of right tegmen defect, now OR s/p MCF approach for repair with Dr. Tao and Dr. Pedro.

## 2024-11-22 NOTE — PROGRESS NOTES
"Juana Motley is a 49 y.o. female on day 4 of admission presenting with Conductive hearing loss of right ear with unrestricted hearing of left ear.       Subjective   LD clamped last night. No issues with headache since. No leaking form incision. No BM yet.    Objective     Physical Exam  Non labored breathing  No Acute distress  Normal heart rate  Jose down. Incision well approximated without breakdown.   HB 1/6  Pastor to the left, AC>BC on the left, AC>BC in the right  A&Ox3      Last Recorded Vitals  Blood pressure 117/74, pulse 57, temperature 36.1 °C (97 °F), temperature source Temporal, resp. rate 16, height 1.676 m (5' 6\"), weight 91.5 kg (201 lb 11.5 oz), SpO2 93%.  Intake/Output last 3 Shifts:  I/O last 3 completed shifts:  In: 360 (3.9 mL/kg) [P.O.:360]  Out: 160 (1.7 mL/kg)   Weight: 91.5 kg     Relevant Results        Scheduled medications  acetaminophen, 650 mg, oral, q6h  acetaZOLAMIDE, 125 mg, oral, BID  atomoxetine, 25 mg, oral, q AM  cefTRIAXone, 2 g, intravenous, q12h  fLuvoxaMINE, 50 mg, oral, Nightly  heparin (porcine), 5,000 Units, subcutaneous, q8h  hydroCHLOROthiazide, 12.5 mg, oral, Daily  insulin lispro, 0-5 Units, subcutaneous, TID AC  levothyroxine, 100 mcg, oral, Nightly  pantoprazole, 40 mg, oral, Daily before breakfast   Or  pantoprazole, 40 mg, intravenous, Daily before breakfast  potassium chloride CR, 20 mEq, oral, Once  rosuvastatin, 5 mg, oral, Daily  sennosides-docusate sodium, 2 tablet, oral, BID  spironolactone, 25 mg, oral, Daily  vancomycin, 1,500 mg, intravenous, q12h      Continuous medications     PRN medications  PRN medications: benzocaine-menthol, clonazePAM, dextrose, dextrose, glucagon, glucagon, methocarbamol, naloxone, ondansetron ODT **OR** ondansetron, oxyCODONE, oxyCODONE, oxygen, vancomycin, white petrolatum                         Assessment/Plan   Assessment & Plan  Conductive hearing loss of right ear with unrestricted hearing of left " ear    Encephalocele    History of bacterial meningitis      48y/o female with history of right tegmen defect, now OR s/p MCF approach for repair with Dr. Tao and Dr. Pedro.    -Drains: LD remove today  -Analgesia:  Scheduled Acetaminophen, Oxycodone 5 and 10mg prn  -Neuro: Home Atomoxetine, Fluvoxamine, Klonopin prn. Continue diamox  -FEN: Soft diet, monitor and replete electrolytes as needed  -Pulm: Non-labored breathing; incentive spirometer 10x/hr while awake  -ID: On Vanc/Ceftriaxone; will transition to PO abx on discharge  -Cardiac: On home hydrochlorothiazide, Spironolactone  -Heme: Monitor H/H, daily CBC  -Endo: Insulin sliding scale & blood glucose POCT Q6hrs x 3days  -GI: Bowel regimen, PPI,  and PRN anti-emetic  -: Tate dc'd 11/19. Voiding spontaneously  -Steroids/Special: On Decadron 8q8 to end 11/20  -Embolic PPx: SQH, SCDs while in bed. NSGY recs rs ASA POD 14  -Dispo: On RNF, dc home likely 11/23           Radha Munoz MD

## 2024-11-22 NOTE — PROGRESS NOTES
"Juana Motley is a 49 y.o. female on day 4 of admission presenting with Conductive hearing loss of right ear with unrestricted hearing of left ear.    Subjective   No acute events over night, patient with only mild ear fullness since drain clamp this evening       Objective     Physical Exam  Aox3  FS  BUE / BLE prox 5 dist 5  LD site cdi  Incision c/d/i    Last Recorded Vitals  Blood pressure 146/86, pulse 60, temperature 36.6 °C (97.9 °F), temperature source Temporal, resp. rate 16, height 1.676 m (5' 6\"), weight 91.5 kg (201 lb 11.5 oz), SpO2 98%.  Intake/Output last 3 Shifts:  I/O last 3 completed shifts:  In: 1570 (17.2 mL/kg) [P.O.:1020; IV Piggyback:550]  Out: 240 (2.6 mL/kg)   Weight: 91.5 kg     Relevant Results                        Assessment/Plan   Assessment & Plan  Conductive hearing loss of right ear with unrestricted hearing of left ear    Encephalocele    History of bacterial meningitis    49F h/o ADHD, anxiety, bipolar I, DMII, JUAN (no CPAP), CAD on asa, p/w otorrhea, CTH/MRI R tegmen defect with encephalocele, 11/18 s/p R MCF, LD placement    11/21 incr pressure in head (presenting sxs per patient), LD re-opened, CTH min incr MLS    Recommendations:  ENT primary  LD clamped  Abx /dex per ENT  Ok for chemo ppx POD2     Will continue to follow           Shakira Gibbs MD      "

## 2024-11-22 NOTE — PROGRESS NOTES
Vancomycin Dosing by Pharmacy- FOLLOW UP    Juana Motley is a 49 y.o. year old female who Pharmacy has been consulted for vancomycin dosing for CNS/meningoencephalitis. Based on the patient's indication and renal status this patient is being dosed based on a goal AUC of 500-600.     Renal function is currently stable.    Current vancomycin dose: 1500 mg given every 12 hours    Most recent random level: 21.7 mcg/mL    Visit Vitals  /72 (BP Location: Right arm, Patient Position: Lying)   Pulse 53   Temp 36.9 °C (98.4 °F) (Temporal)   Resp 16        Lab Results   Component Value Date    CREATININE 0.78 2024    CREATININE 0.64 2024    CREATININE 0.57 2024    CREATININE 0.69 2024        Patient weight is as follows:   Vitals:    24 1246   Weight: 91.5 kg (201 lb 11.5 oz)       Cultures:  No results found for the encounter in last 14 days.       I/O last 3 completed shifts:  In: 360 (3.9 mL/kg) [P.O.:360]  Out: 160 (1.7 mL/kg)   Weight: 91.5 kg   I/O during current shift:  No intake/output data recorded.    Temp (24hrs), Av.3 °C (97.4 °F), Min:36 °C (96.8 °F), Max:36.9 °C (98.4 °F)      Assessment/Plan    Within goal AUC range. Continue current vancomycin regimen.    This dosing regimen is predicted by [employer] to result in the following pharmacokinetic parameters:    Loading dose: N/A  Regimen: 1500 mg IV every 12 hours.  Start time: 10:46 on 2024  Exposure target: AUC24 (range)400-600 mg/L.hr   [id number]: 542 mg/L.hr  AUC24,ss: 546 mg/L.hr  Probability of AUC24 > 400: 100 %  Ctrough,ss: 16 mg/L  Probability of Ctrough,ss > 20: 8 %      The next level will be obtained on  at AM labs. May be obtained sooner if clinically indicated.   Will continue to monitor renal function daily while on vancomycin and order serum creatinine at least every 48 hours if not already ordered.  Follow for continued vancomycin needs, clinical response, and signs/symptoms of toxicity.        HELIO SWENSON

## 2024-11-22 NOTE — PROGRESS NOTES
Physical Therapy    Physical Therapy Treatment    Patient Name: Juana Motley  MRN: [id number]  Department: [facility]  Room: [address] [place] [address]-  Today's Date: 11/22/2024  Time Calculation  Start Time: 1520  Stop Time: 1543  Time Calculation (min): 23 min         Assessment/Plan   PT Assessment  End of Session Communication: Bedside nurse  Assessment Comment: .  End of Session Patient Position: Bed, 3 rail up, Alarm off, not on at start of session  PT Plan  Inpatient/Swing Bed or Outpatient: Inpatient  PT Plan  Treatment/Interventions: Bed mobility, Transfer training, Gait training, Stair training, Strengthening, Endurance training, Therapeutic activity, Therapeutic exercise  PT Plan: Ongoing PT  PT Frequency: 4 times per week  PT Discharge Recommendations: Low intensity level of continued care  PT Recommended Transfer Status: Assist x1  PT - OK to Discharge: Yes      General Visit Information:   PT  Visit  PT Received On: 11/22/24  General  Prior to Session Communication: Bedside nurse  Patient Position Received: Alarm off, not on at start of session, Up in bathroom  General Comment: Pt exiting restroom upon entering. pt ambualtes 300' no AD and able to negotiate 12 stairs with 1 rail    Subjective   Precautions:  Precautions  Medical Precautions: Fall precautions    Vital Signs (Past 2hrs)        Date/Time Vitals Session Patient Position Pulse Resp SpO2 BP MAP (mmHg)    11/22/24 1554 --  --  67  18  97 %  112/75  88                         Objective   Pain:  Pain Assessment  0-10 (Numeric) Pain Score: 0 - No pain  Cognition:  Cognition  Orientation Level: Oriented X4       Treatments:            Ambulation/Gait Training 1  Surface 1: Level tile  Device 1: No device  Assistance 1: Close supervision, Minimal verbal cues  Quality of Gait 1: Decreased step length  Comments/Distance (ft) 1: 300', cues to inhbit lateral sway  Transfer 1  Technique 1: Stand to sit, Sit to stand  Transfer Level of Assistance 1: Close supervision,  Minimal verbal cues  Trials/Comments 1: cues for safe pacing    Stairs  Stairs: Yes  Stairs  Rails 1: Right  Assistance 1: Distant supervision  Comment/Number of Steps 1: 12, cues for safe pacing    Outcome Measures:  [facility] Basic Mobility  Turning from your back to your side while in a flat bed without using bedrails: A little  Moving from lying on your back to sitting on the side of a flat bed without using bedrails: A little  Moving to and from bed to chair (including a wheelchair): A little  Standing up from a chair using your arms (e.g. wheelchair or bedside chair): A little  To walk in hospital room: A little  Climbing 3-5 steps with railing: A little  Basic Mobility - Total Score: 18    Education Documentation  Mobility Training, taught by Pancho Sanchez PTA at 11/22/2024  5:18 PM.  Learner: Patient  Readiness: Acceptance  Method: Explanation  Response: Verbalizes Understanding    Education Comments  No comments found.        OP EDUCATION:       Encounter Problems       Encounter Problems (Active)       PT Problem       Patient will complete bed mobility with independence  (Progressing)       Start:  11/19/24    Expected End:  12/03/24            Patient will complete STS with independence using No Device without acute LOB   (Progressing)       Start:  11/19/24    Expected End:  12/03/24            Patient will ambulate >/=200' with No Device with independence without acute LOB, or postural sway  (Progressing)       Start:  11/19/24    Expected End:  12/03/24            Patient will ascend/descend 12 steps with x1 handrail and independence without acute LOB.    (Progressing)       Start:  11/19/24    Expected End:  12/03/24            Patient will independently complete standing dynamic balance activities without acute LOB, while maintaining midline posture.  (Progressing)       Start:  11/19/24    Expected End:  12/03/24               Pain - Adult

## 2024-11-23 ENCOUNTER — PHARMACY VISIT (OUTPATIENT)
Dept: PHARMACY | Facility: CLINIC | Age: 49
End: 2024-11-23
Payer: COMMERCIAL

## 2024-11-23 VITALS
DIASTOLIC BLOOD PRESSURE: 83 MMHG | HEIGHT: 66 IN | WEIGHT: 201.72 LBS | OXYGEN SATURATION: 99 % | HEART RATE: 62 BPM | TEMPERATURE: 97.3 F | BODY MASS INDEX: 32.42 KG/M2 | SYSTOLIC BLOOD PRESSURE: 132 MMHG | RESPIRATION RATE: 18 BRPM

## 2024-11-23 LAB
GLUCOSE BLD MANUAL STRIP-MCNC: 111 MG/DL (ref 74–99)
GLUCOSE BLD MANUAL STRIP-MCNC: 140 MG/DL (ref 74–99)

## 2024-11-23 PROCEDURE — 2500000004 HC RX 250 GENERAL PHARMACY W/ HCPCS (ALT 636 FOR OP/ED)

## 2024-11-23 PROCEDURE — 99024 POSTOP FOLLOW-UP VISIT: CPT | Performed by: OTOLARYNGOLOGY

## 2024-11-23 PROCEDURE — RXMED WILLOW AMBULATORY MEDICATION CHARGE

## 2024-11-23 PROCEDURE — 2500000004 HC RX 250 GENERAL PHARMACY W/ HCPCS (ALT 636 FOR OP/ED): Performed by: STUDENT IN AN ORGANIZED HEALTH CARE EDUCATION/TRAINING PROGRAM

## 2024-11-23 PROCEDURE — 2500000001 HC RX 250 WO HCPCS SELF ADMINISTERED DRUGS (ALT 637 FOR MEDICARE OP): Performed by: STUDENT IN AN ORGANIZED HEALTH CARE EDUCATION/TRAINING PROGRAM

## 2024-11-23 PROCEDURE — 82947 ASSAY GLUCOSE BLOOD QUANT: CPT

## 2024-11-23 PROCEDURE — 2500000005 HC RX 250 GENERAL PHARMACY W/O HCPCS: Performed by: STUDENT IN AN ORGANIZED HEALTH CARE EDUCATION/TRAINING PROGRAM

## 2024-11-23 PROCEDURE — 2500000002 HC RX 250 W HCPCS SELF ADMINISTERED DRUGS (ALT 637 FOR MEDICARE OP, ALT 636 FOR OP/ED): Performed by: STUDENT IN AN ORGANIZED HEALTH CARE EDUCATION/TRAINING PROGRAM

## 2024-11-23 RX ORDER — ACETAZOLAMIDE 125 MG/1
125 TABLET ORAL 2 TIMES DAILY
Qty: 60 TABLET | Refills: 0 | Status: SHIPPED | OUTPATIENT
Start: 2024-11-23 | End: 2024-12-04 | Stop reason: SDUPTHER

## 2024-11-23 SDOH — ECONOMIC STABILITY: TRANSPORTATION INSECURITY: IN THE PAST 12 MONTHS, HAS LACK OF TRANSPORTATION KEPT YOU FROM MEDICAL APPOINTMENTS OR FROM GETTING MEDICATIONS?: NO

## 2024-11-23 SDOH — ECONOMIC STABILITY: HOUSING INSECURITY: AT ANY TIME IN THE PAST 12 MONTHS, WERE YOU HOMELESS OR LIVING IN A SHELTER (INCLUDING NOW)?: NO

## 2024-11-23 SDOH — ECONOMIC STABILITY: INCOME INSECURITY: IN THE PAST 12 MONTHS HAS THE ELECTRIC, GAS, OIL, OR WATER COMPANY THREATENED TO SHUT OFF SERVICES IN YOUR HOME?: NO

## 2024-11-23 SDOH — SOCIAL STABILITY: SOCIAL INSECURITY: WITHIN THE LAST YEAR, HAVE YOU BEEN AFRAID OF YOUR PARTNER OR EX-PARTNER?: NO

## 2024-11-23 SDOH — ECONOMIC STABILITY: HOUSING INSECURITY: IN THE LAST 12 MONTHS, WAS THERE A TIME WHEN YOU WERE NOT ABLE TO PAY THE MORTGAGE OR RENT ON TIME?: NO

## 2024-11-23 SDOH — ECONOMIC STABILITY: HOUSING INSECURITY: IN THE PAST 12 MONTHS, HOW MANY TIMES HAVE YOU MOVED WHERE YOU WERE LIVING?: 1

## 2024-11-23 SDOH — ECONOMIC STABILITY: FOOD INSECURITY: WITHIN THE PAST 12 MONTHS, THE FOOD YOU BOUGHT JUST DIDN'T LAST AND YOU DIDN'T HAVE MONEY TO GET MORE.: NEVER TRUE

## 2024-11-23 SDOH — ECONOMIC STABILITY: FOOD INSECURITY: WITHIN THE PAST 12 MONTHS, YOU WORRIED THAT YOUR FOOD WOULD RUN OUT BEFORE YOU GOT THE MONEY TO BUY MORE.: NEVER TRUE

## 2024-11-23 SDOH — ECONOMIC STABILITY: FOOD INSECURITY: HOW HARD IS IT FOR YOU TO PAY FOR THE VERY BASICS LIKE FOOD, HOUSING, MEDICAL CARE, AND HEATING?: NOT HARD AT ALL

## 2024-11-23 SDOH — SOCIAL STABILITY: SOCIAL INSECURITY: WITHIN THE LAST YEAR, HAVE YOU BEEN HUMILIATED OR EMOTIONALLY ABUSED IN OTHER WAYS BY YOUR PARTNER OR EX-PARTNER?: NO

## 2024-11-23 ASSESSMENT — COGNITIVE AND FUNCTIONAL STATUS - GENERAL
DAILY ACTIVITIY SCORE: 24
MOBILITY SCORE: 24

## 2024-11-23 ASSESSMENT — PAIN SCALES - GENERAL
PAINLEVEL_OUTOF10: 8
PAINLEVEL_OUTOF10: 7

## 2024-11-23 ASSESSMENT — ACTIVITIES OF DAILY LIVING (ADL): LACK_OF_TRANSPORTATION: NO

## 2024-11-23 NOTE — DISCHARGE SUMMARY
Discharge Diagnosis  Conductive hearing loss of right ear with unrestricted hearing of left ear    Issues Requiring Follow-Up  Routine post-op follow-up    Test Results Pending At Discharge  Pending Labs       Order Current Status    Surgical Pathology Exam In process            Hospital Course  Juana Motley is a 49 year old female with PMH of ADHD, anxiety, bipolar I, DMII, JUAN (no CPAP), CAD on asa, p/w otorrhea, CTH/MRI R tegmen defect with encephalocele, 11/18 s/p R MCF, LD placement, CTH POC.  Lumbar clamping trial was initially performed on the evening of 11/20. The patient subsequently developed significant head and surgical site pain. So, the lumbar drain was unclamped and a CTH was obtained which was stable from her prior post-op CTH. This pain improved with the addition of Diamox. The drain was eventually removed on 11/22 after she tolerated a 2nd clamping trial. On the day of discharge, the patient was seen and evaluated and deemed suitable for discharge.  She was tolerating diet, ambulating independently, and pain is controlled. The patient was given detailed discharge instructions and were scheduled to follow up as an outpatient.     Pertinent Physical Exam At Time of Discharge  Physical Exam  Non labored breathing  No Acute distress  Normal heart rate  Jose down. Incision well approximated without breakdown.   HB 1/6  A&Ox3    Home Medications     Medication List      START taking these medications     acetaminophen 325 mg tablet; Commonly known as: Tylenol; Take 2 tablets   (650 mg) by mouth every 6 hours. Do not exceed 4000mg acetaminophen from   all medication products in a 24hr period   acetaZOLAMIDE 125 mg tablet; Commonly known as: Diamox; Take 1 tablet   (125 mg) by mouth 2 times a day.   ciprofloxacin 500 mg tablet; Commonly known as: Cipro; Take 1 tablet   (500 mg) by mouth 2 times a day for 7 days. Start taking the evening after   you are discharged from the hospital and continue until  you have taken all   pills.   methocarbamol 500 mg tablet; Commonly known as: Robaxin; Take 1 tablet   (500 mg) by mouth 4 times a day as needed (For jaw or facial pain).   mupirocin 2 % ointment; Commonly known as: Bactroban; Apply topically 2   times a day for 7 days. Apply to right scalp incision 2x/day for 1 week   after surgery.   ondansetron 4 mg tablet; Commonly known as: Zofran; Take 1 tablet (4 mg)   by mouth every 8 hours if needed for nausea or vomiting.   oxyCODONE 5 mg immediate release tablet; Commonly known as: Roxicodone;   Take 1-2 tablet (5 -10 mg) by mouth every 6 hours if needed for severe   pain (7 - 10).   Senexon-S 8.6-50 mg tablet; Generic drug: sennosides-docusate sodium;   Take 2 tablets by mouth 2 times a day.     CONTINUE taking these medications     atomoxetine 25 mg capsule; Commonly known as: Strattera   clonazePAM 0.5 mg disintegrating tablet; Commonly known as: KlonoPIN   Dexcom G6  misc; Generic drug: blood-glucose meter,continuous   Dexcom G6 Sensor device; Generic drug: blood-glucose sensor   Dexcom G6 Transmitter device; Generic drug: blood-glucose transmitter   device   FERROUS GLUCONATE ORAL   fLuvoxaMINE 50 mg tablet; Commonly known as: Luvox   hydroCHLOROthiazide 12.5 mg tablet; Commonly known as: Microzide   potassium chloride ER 10 mEq ER capsule; Commonly known as: Micro-K   rosuvastatin 5 mg tablet; Commonly known as: Crestor   spironolactone 25 mg tablet; Commonly known as: Aldactone   Synthroid 100 mcg tablet; Generic drug: levothyroxine     STOP taking these medications     aspirin 81 mg EC tablet   HYDROcodone-acetaminophen 7.5-325 mg tablet; Commonly known as: Norco       Outpatient Follow-Up  Future Appointments   Date Time Provider Department Center   12/3/2024  1:45 PM Cristela Tao MD [id number]   12/30/2024  1:00 PM Sridhar Pedro MD [id number]       Yudi Bailey MD

## 2024-11-27 NOTE — PROGRESS NOTES
History Of Present Illness:  Juana Motley is a 49 y.o. female with a history of meningitis and AOM who presents to me today for a postoperative visit. She is s/p right side MCF for encephalocele repair & tegmen defect repair on 24. She is overall doing well. She is no longer using oxycodone and has been having bowel movements with regular use of stool softeners. She denies severe headaches and vision changes. No fluid leakage from the nose or incision. Her jaw opening is still restricted, but she is otherwise back to her normal activities.    Recall 24:  Juana Motley is a 49 y.o. female with a history of meningitis and AOM  Here to discuss CT results via VV.    Surgical History:  She has a past surgical history that includes Cardiac catheterization; Cholecystectomy; Hernia repair (2024); Dilation and curettage of uterus; Myringotomy w/ tubes (2024); Ear tube removal (2024); Tonsillectomy; and  section, low transverse.     Allergies:  Metformin, Penicillin, and Topiramate    Medications:   Current Outpatient Medications   Medication Instructions    acetaminophen (TYLENOL) 650 mg, oral, Every 6 hours, Do not exceed 4000mg acetaminophen from all medication products in a 24hr period    acetaZOLAMIDE (DIAMOX) 125 mg, oral, 2 times daily    atomoxetine (STRATTERA) 25 mg, Every morning    clonazePAM (KLONOPIN) 0.5 mg, As needed    Dexcom G6  misc as directed    Dexcom G6 Sensor device USE AS DIRECTED AND CHANGE SENSOR EVERY 10 DAYS    Dexcom G6 Transmitter device as directed    FERROUS GLUCONATE ORAL 65 mg, Daily    fLuvoxaMINE (LUVOX) 50 mg, Nightly    hydroCHLOROthiazide (Microzide) 12.5 mg tablet Take 1 tablet (12.5 mg) by mouth once daily.    methocarbamol (ROBAXIN) 500 mg, oral, 4 times daily PRN    metoclopramide (Reglan) 10 mg tablet 1 tablet, Every 6 hours    ondansetron (ZOFRAN) 4 mg, oral, Every 8 hours PRN    oxyCODONE (Roxicodone) 5 mg immediate release tablet  Take 1-2 tablet (5 -10 mg) by mouth every 6 hours if needed for severe pain (7 - 10).    potassium chloride ER (Micro-K) 10 mEq ER capsule 10 mEq, Daily    rosuvastatin (CRESTOR) 5 mg, Daily    sennosides-docusate sodium (Tiara-Colace) 8.6-50 mg tablet 2 tablets, oral, 2 times daily    spironolactone (ALDACTONE) 25 mg, Daily    Synthroid 100 mcg tablet Take 1 tablet (100 mcg) by mouth once daily at bedtime. Except 1/2 tablet on sundays      Review of Systems:   A comprehensive 10-point review of systems was obtained including constitutional, neurological, HEENT, pulmonary, cardiovascular, genito-urinary, and other pertinent systems and was negative except as noted in the HPI.      Physical Exam:  Constitutional   General appearance: Healthy-appearing, well-nourished, well groomed, in no acute distress.   Ability to communicate: Normal communication without aids, normal voice quality.       Head and face: Atraumatic with no masses, lesions, or scarring.   Facial strength: Normal strength and symmetry, no synkinesis or facial tic.     Ears  Otoscopic examination: Sutures removed from right surgical incision which is healing well, flat and without underlying fluid collection. Bilateral normal otoscopy of the tympanic membrane     Nose: Dorsum symmetric with no visible or palpable deformities.     Oral Cavity/Mouth  Lips, teeth, and gums: Normal lips, gums, and dentition. Mild trismus.     Oropharynx: Mucosa moist, no lesions.     Neck: Symmetrical, trachea midline. No masses visible.     Neurological/Psychiatric  Cranial Nerve Examination: II - XII grossly intact.  Orientation to person, place, and time: Normal.  Mood and affect: Normal.     Skin: Normal without rashes or lesions. Removed the stitch from her back     Pulmonary  Respiratory effort: Chest expands symmetrically.     Cardiovascular: Good peripheral pulses     Extremities: Appearance of extremities: Normal. Gait normal.     Last Recorded Vitals:  Height  "1.676 m (5' 6\"), weight 87.5 kg (193 lb).     Assessment/Plan   49 y.o. female with a history of meningitis and AOM who presents to me today for a postoperative visit. She is s/p right side MCF for encephalocele repair & tegemen defect repair on 11/18/24. Her sutures were removed today; she is healing well. Her headaches are under control on her current dose of diamox. Will likely continue for a full 3mo and taper off, provided she has no recurrence of symptoms suggestive of IIH. We discussed jaw exercises and will refer her to PT to help with her trismus. Follow-up in 6mo with audiogram.    Sigifredo Bragg MD  Neurotology Fellow  "

## 2024-12-03 ENCOUNTER — APPOINTMENT (OUTPATIENT)
Dept: OTOLARYNGOLOGY | Facility: CLINIC | Age: 49
End: 2024-12-03
Payer: COMMERCIAL

## 2024-12-03 VITALS — WEIGHT: 193 LBS | HEIGHT: 66 IN | BODY MASS INDEX: 31.02 KG/M2

## 2024-12-03 DIAGNOSIS — M26.609 TEMPOROMANDIBULAR JOINT DISORDER: ICD-10-CM

## 2024-12-03 DIAGNOSIS — Z86.61 HISTORY OF BACTERIAL MENINGITIS: ICD-10-CM

## 2024-12-03 DIAGNOSIS — Q16.5 TEGMEN DEFECT OF BASE OF SKULL: ICD-10-CM

## 2024-12-03 DIAGNOSIS — Z48.89 ENCOUNTER FOR POSTOPERATIVE CARE: Primary | ICD-10-CM

## 2024-12-03 PROCEDURE — 3008F BODY MASS INDEX DOCD: CPT | Performed by: OTOLARYNGOLOGY

## 2024-12-03 PROCEDURE — 99024 POSTOP FOLLOW-UP VISIT: CPT | Performed by: OTOLARYNGOLOGY

## 2024-12-03 RX ORDER — METOCLOPRAMIDE 10 MG/1
1 TABLET ORAL EVERY 6 HOURS
COMMUNITY
Start: 2024-11-28

## 2024-12-03 ASSESSMENT — PATIENT HEALTH QUESTIONNAIRE - PHQ9
1. LITTLE INTEREST OR PLEASURE IN DOING THINGS: NOT AT ALL
SUM OF ALL RESPONSES TO PHQ9 QUESTIONS 1 AND 2: 0
2. FEELING DOWN, DEPRESSED OR HOPELESS: NOT AT ALL

## 2024-12-03 NOTE — LETTER
2024     Sridhar Pedro MD  [address] [place] [address] Dr Beata French [facility], [address] [address]    Patient: Juana Motley   YOB: 1975   Date of Visit: 12/3/2024       Dear Dr. Sridhar Pedro MD:    Thank you for referring Juana Motley to me for evaluation. Below are my notes for this consultation.  If you have questions, please do not hesitate to call me. I look forward to following your patient along with you.       Sincerely,     Cristela Tao MD      CC: No Recipients  ______________________________________________________________________________________    History Of Present Illness:  Juana Motley is a 49 y.o. female with a history of meningitis and AOM who presents to me today for a postoperative visit. She is s/p right side MCF for encephalocele repair & tegmen defect repair on 24. She is overall doing well. She is no longer using oxycodone and has been having bowel movements with regular use of stool softeners. She denies severe headaches and vision changes. No fluid leakage from the nose or incision. Her jaw opening is still restricted, but she is otherwise back to her normal activities.    Recall 24:  Juana Motley is a 49 y.o. female with a history of meningitis and AOM  Here to discuss CT results via VV.    Surgical History:  She has a past surgical history that includes Cardiac catheterization; Cholecystectomy; Hernia repair (2024); Dilation and curettage of uterus; Myringotomy w/ tubes (2024); Ear tube removal (2024); Tonsillectomy; and  section, low transverse.     Allergies:  Metformin, Penicillin, and Topiramate    Medications:   Current Outpatient Medications   Medication Instructions   • acetaminophen (TYLENOL) 650 mg, oral, Every 6 hours, Do not exceed 4000mg acetaminophen from all medication products in a 24hr period   • acetaZOLAMIDE (DIAMOX) 125 mg, oral, 2 times daily   • atomoxetine (STRATTERA) 25 mg,  Every morning   • clonazePAM (KLONOPIN) 0.5 mg, As needed   • Dexcom G6  misc as directed   • Dexcom G6 Sensor device USE AS DIRECTED AND CHANGE SENSOR EVERY 10 DAYS   • Dexcom G6 Transmitter device as directed   • FERROUS GLUCONATE ORAL 65 mg, Daily   • fLuvoxaMINE (LUVOX) 50 mg, Nightly   • hydroCHLOROthiazide (Microzide) 12.5 mg tablet Take 1 tablet (12.5 mg) by mouth once daily.   • methocarbamol (ROBAXIN) 500 mg, oral, 4 times daily PRN   • metoclopramide (Reglan) 10 mg tablet 1 tablet, Every 6 hours   • ondansetron (ZOFRAN) 4 mg, oral, Every 8 hours PRN   • oxyCODONE (Roxicodone) 5 mg immediate release tablet Take 1-2 tablet (5 -10 mg) by mouth every 6 hours if needed for severe pain (7 - 10).   • potassium chloride ER (Micro-K) 10 mEq ER capsule 10 mEq, Daily   • rosuvastatin (CRESTOR) 5 mg, Daily   • sennosides-docusate sodium (Tiara-Colace) 8.6-50 mg tablet 2 tablets, oral, 2 times daily   • spironolactone (ALDACTONE) 25 mg, Daily   • Synthroid 100 mcg tablet Take 1 tablet (100 mcg) by mouth once daily at bedtime. Except 1/2 tablet on sundays      Review of Systems:   A comprehensive 10-point review of systems was obtained including constitutional, neurological, HEENT, pulmonary, cardiovascular, genito-urinary, and other pertinent systems and was negative except as noted in the HPI.      Physical Exam:  Constitutional   General appearance: Healthy-appearing, well-nourished, well groomed, in no acute distress.   Ability to communicate: Normal communication without aids, normal voice quality.       Head and face: Atraumatic with no masses, lesions, or scarring.   Facial strength: Normal strength and symmetry, no synkinesis or facial tic.     Ears  Otoscopic examination: Sutures removed from right surgical incision which is healing well, flat and without underlying fluid collection. Bilateral normal otoscopy of the tympanic membrane     Nose: Dorsum symmetric with no visible or palpable deformities.    "  Oral Cavity/Mouth  Lips, teeth, and gums: Normal lips, gums, and dentition. Mild trismus.     Oropharynx: Mucosa moist, no lesions.     Neck: Symmetrical, trachea midline. No masses visible.     Neurological/Psychiatric  Cranial Nerve Examination: II - XII grossly intact.  Orientation to person, place, and time: Normal.  Mood and affect: Normal.     Skin: Normal without rashes or lesions. Removed the stitch from her back     Pulmonary  Respiratory effort: Chest expands symmetrically.     Cardiovascular: Good peripheral pulses     Extremities: Appearance of extremities: Normal. Gait normal.     Last Recorded Vitals:  Height 1.676 m (5' 6\"), weight 87.5 kg (193 lb).     Assessment/Plan  49 y.o. female with a history of meningitis and AOM who presents to me today for a postoperative visit. She is s/p right side MCF for encephalocele repair & tegemen defect repair on 11/18/24. Her sutures were removed today; she is healing well. Her headaches are under control on her current dose of diamox. Will likely continue for a full 3mo and taper off, provided she has no recurrence of symptoms suggestive of IIH. We discussed jaw exercises and will refer her to PT to help with her trismus. Follow-up in 6mo with audiogram.    Sigifredo Bragg MD  Neurotology Fellow  "

## 2024-12-03 NOTE — LETTER
2024     Santiago Lr MD  [address] [facility]  [address]  [place] [address]    Patient: Juana Motley   YOB: 1975   Date of Visit: 12/3/2024       Dear Dr. Santiago Lr MD:    Thank you for referring Juana Motley to me for evaluation. Below are my notes for this consultation.  If you have questions, please do not hesitate to call me. I look forward to following your patient along with you.       Sincerely,     Cristela Tao MD      CC: Kimberly Morales, DO  ______________________________________________________________________________________    History Of Present Illness:  Juana Motley is a 49 y.o. female with a history of meningitis and AOM who presents to me today for a postoperative visit. She is s/p right side MCF for encephalocele repair & tegmen defect repair on 24. She is overall doing well. She is no longer using oxycodone and has been having bowel movements with regular use of stool softeners. She denies severe headaches and vision changes. No fluid leakage from the nose or incision. Her jaw opening is still restricted, but she is otherwise back to her normal activities.    Recall 24:  Juana Motley is a 49 y.o. female with a history of meningitis and AOM  Here to discuss CT results via VV.    Surgical History:  She has a past surgical history that includes Cardiac catheterization; Cholecystectomy; Hernia repair (2024); Dilation and curettage of uterus; Myringotomy w/ tubes (2024); Ear tube removal (2024); Tonsillectomy; and  section, low transverse.     Allergies:  Metformin, Penicillin, and Topiramate    Medications:   Current Outpatient Medications   Medication Instructions   • acetaminophen (TYLENOL) 650 mg, oral, Every 6 hours, Do not exceed 4000mg acetaminophen from all medication products in a 24hr period   • acetaZOLAMIDE (DIAMOX) 125 mg, oral, 2 times daily   • atomoxetine (STRATTERA)  25 mg, Every morning   • clonazePAM (KLONOPIN) 0.5 mg, As needed   • Dexcom G6  misc as directed   • Dexcom G6 Sensor device USE AS DIRECTED AND CHANGE SENSOR EVERY 10 DAYS   • Dexcom G6 Transmitter device as directed   • FERROUS GLUCONATE ORAL 65 mg, Daily   • fLuvoxaMINE (LUVOX) 50 mg, Nightly   • hydroCHLOROthiazide (Microzide) 12.5 mg tablet Take 1 tablet (12.5 mg) by mouth once daily.   • methocarbamol (ROBAXIN) 500 mg, oral, 4 times daily PRN   • metoclopramide (Reglan) 10 mg tablet 1 tablet, Every 6 hours   • ondansetron (ZOFRAN) 4 mg, oral, Every 8 hours PRN   • oxyCODONE (Roxicodone) 5 mg immediate release tablet Take 1-2 tablet (5 -10 mg) by mouth every 6 hours if needed for severe pain (7 - 10).   • potassium chloride ER (Micro-K) 10 mEq ER capsule 10 mEq, Daily   • rosuvastatin (CRESTOR) 5 mg, Daily   • sennosides-docusate sodium (Tiara-Colace) 8.6-50 mg tablet 2 tablets, oral, 2 times daily   • spironolactone (ALDACTONE) 25 mg, Daily   • Synthroid 100 mcg tablet Take 1 tablet (100 mcg) by mouth once daily at bedtime. Except 1/2 tablet on sundays      Review of Systems:   A comprehensive 10-point review of systems was obtained including constitutional, neurological, HEENT, pulmonary, cardiovascular, genito-urinary, and other pertinent systems and was negative except as noted in the HPI.      Physical Exam:  Constitutional   General appearance: Healthy-appearing, well-nourished, well groomed, in no acute distress.   Ability to communicate: Normal communication without aids, normal voice quality.       Head and face: Atraumatic with no masses, lesions, or scarring.   Facial strength: Normal strength and symmetry, no synkinesis or facial tic.     Ears  Otoscopic examination: Sutures removed from right surgical incision which is healing well, flat and without underlying fluid collection. Bilateral normal otoscopy of the tympanic membrane     Nose: Dorsum symmetric with no visible or palpable  "deformities.     Oral Cavity/Mouth  Lips, teeth, and gums: Normal lips, gums, and dentition. Mild trismus.     Oropharynx: Mucosa moist, no lesions.     Neck: Symmetrical, trachea midline. No masses visible.     Neurological/Psychiatric  Cranial Nerve Examination: II - XII grossly intact.  Orientation to person, place, and time: Normal.  Mood and affect: Normal.     Skin: Normal without rashes or lesions. Removed the stitch from her back     Pulmonary  Respiratory effort: Chest expands symmetrically.     Cardiovascular: Good peripheral pulses     Extremities: Appearance of extremities: Normal. Gait normal.     Last Recorded Vitals:  Height 1.676 m (5' 6\"), weight 87.5 kg (193 lb).     Assessment/Plan  49 y.o. female with a history of meningitis and AOM who presents to me today for a postoperative visit. She is s/p right side MCF for encephalocele repair & tegemen defect repair on 11/18/24. Her sutures were removed today; she is healing well. Her headaches are under control on her current dose of diamox. Will likely continue for a full 3mo and taper off, provided she has no recurrence of symptoms suggestive of IIH. We discussed jaw exercises and will refer her to PT to help with her trismus. Follow-up in 6mo with audiogram.    Sigifredo Bragg MD  Neurotology Fellow    "

## 2024-12-04 DIAGNOSIS — Q01.9 ENCEPHALOCELE: ICD-10-CM

## 2024-12-04 LAB
LAB AP ASR DISCLAIMER: NORMAL
LABORATORY COMMENT REPORT: NORMAL
PATH REPORT.FINAL DX SPEC: NORMAL
PATH REPORT.GROSS SPEC: NORMAL
PATH REPORT.RELEVANT HX SPEC: NORMAL
PATH REPORT.TOTAL CANCER: NORMAL

## 2024-12-04 RX ORDER — ACETAZOLAMIDE 125 MG/1
125 TABLET ORAL 2 TIMES DAILY
Qty: 60 TABLET | Refills: 1 | Status: SHIPPED | OUTPATIENT
Start: 2024-12-04 | End: 2025-02-02

## 2024-12-30 ENCOUNTER — TELEMEDICINE (OUTPATIENT)
Dept: NEUROSURGERY | Facility: HOSPITAL | Age: 49
End: 2024-12-30
Payer: COMMERCIAL

## 2024-12-30 ENCOUNTER — APPOINTMENT (OUTPATIENT)
Dept: NEUROSURGERY | Facility: HOSPITAL | Age: 49
End: 2024-12-30
Payer: COMMERCIAL

## 2024-12-30 DIAGNOSIS — Q01.9 ENCEPHALOCELE: ICD-10-CM

## 2024-12-30 DIAGNOSIS — H66.11 CHRONIC TUBOTYMPANIC SUPPURATIVE OTITIS MEDIA OF RIGHT EAR: Primary | ICD-10-CM

## 2024-12-30 PROCEDURE — 99211 OFF/OP EST MAY X REQ PHY/QHP: CPT | Performed by: NURSE PRACTITIONER

## 2024-12-30 NOTE — PROGRESS NOTES
[facility]  Neurosurgery    History of Present Illness      Juana Motley is a 49-year-old female with a PMH significant for CAD on ASA (EF 55-60% 06/2024), HTN, DMT2, hypothyroidism, cervical radiculopathy, migraine headache, intrusive OCD, and tobacco use. Patient presented for evaluation back in February for AMS, neck pain, and ear pain. She underwent CTH of her head that was negative for acute findings. Given concern for meningitis a LP was performed and notable for meningitis. ENT was consulted during hospital stay for otogenic mastoiditis and loop PE tube was placed. Patient was started on vancomycin and meropenem by ID. She was able to be discharged home to ceftriaxone 2g BID. Patient was referred to neurotology given concern for recurrent meningitis. 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 Patient is now s/p R MCF with LD placement on 11/18/24. Postoperative course was significant for headaches and surgical site pain that improved with diamox. She was able to be discharged to home on 11/23/24.     Patient was seen for her POV with Dr. Tao at which time sutures were removed. She was recommended for treatment with diamox x 3 months with plans to taper. Referral was made for PT for trismus. She presents for POV virtually.       Intermittent headaches but not pressure that she was having while in the hospital when the drain was turned. More consistent with her baseline sinus pain/ pressure. Ongoing dizziness that is worse with standing. Was having difficulties with low blood pressure where she was having systolic in the 90s and low 100s. Improved to 124 systolic yesterday. Has not been blowing her nose. No clear nasal or ear drainage. Incision is starting to heal was applying bacitracin.       Patient Visit Information:   Visit Type: Virtual Visit - An interactive audio telecommunication system which permits real time communications between the patient (at the originating site) and the  provider (at the distant site) was utilized to provide this telehealth service. Verbal consent for encounter: Verbal consent was requested and obtained from patient, or from parent/guardian if minor, on this date for a telehealth visit.                Objective      Vitals/ Physical Exam:    Deferred - virtual visit           Relevant Results:    CTH 11/21/24: Postoperative changes with extra-axial fluid collection along R temporal lobe and 3mm MLS.         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 Tao and Dr. Pedro. Intermittent headaches but now more consistent with her baseline prior to surgery. Continues on diamox as directed by ENT. No incisional or nare drainage noted.     - CTH for postoperative evaluation; if stable no additional imaging recommended   - Ok to resume normal activity from a neurosurgical perspective as tolerated by the patient   - ENT follow up as recommended     Once CTH is completed will call patient with update. If no significant changes she may follow up with neurosurgery as needed. All questions answered.         Medical History     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     Coronary artery disease     Depression     Dizziness     Encephalocele     HL (hearing loss)     Hypertension     Hypothyroidism     Migraine     Nephrolithiasis     Peripheral neuropathy      Pneumococcal meningitis ([facility]) 2024    Sleep apnea     non compliant with cpap    TMJ (dislocation of temporomandibular joint)     Tobacco use     Type 2 diabetes mellitus     Vitamin D deficiency      Past Surgical History:   Procedure Laterality Date    CARDIAC CATHETERIZATION       SECTION, LOW TRANSVERSE      CHOLECYSTECTOMY      DILATION AND CURETTAGE OF UTERUS      ?    EAR TUBE REMOVAL  2024    and right myringotomy with tube (T-Style) placement    HERNIA REPAIR  2024    MYRINGOTOMY W/ TUBES  2024    TONSILLECTOMY       Social History     Tobacco Use    Smoking status: Former     Types: Cigarettes    Smokeless tobacco: Current    Tobacco comments:     Nicotine vape    Vaping Use    Vaping status: Every Day   Substance Use Topics    Alcohol use: Never    Drug use: Never     Family History   Problem Relation Name Age of Onset    Skin cancer Mother      Heart attack Father      Pancreatic cancer Paternal Grandmother       Allergies   Allergen Reactions    Metformin Diarrhea    Penicillin Other    Topiramate Drowsiness and Itching     Current Outpatient Medications   Medication Instructions    acetaminophen (TYLENOL) 650 mg, oral, Every 6 hours, Do not exceed 4000mg acetaminophen from all medication products in a 24hr period    acetaZOLAMIDE (DIAMOX) 125 mg, oral, 2 times daily    atomoxetine (STRATTERA) 25 mg, Every morning    clonazePAM (KLONOPIN) 0.5 mg, As needed    Dexcom G6  misc as directed    Dexcom G6 Sensor device USE AS DIRECTED AND CHANGE SENSOR EVERY 10 DAYS    Dexcom G6 Transmitter device as directed    FERROUS GLUCONATE ORAL 65 mg, Daily    fLuvoxaMINE (LUVOX) 50 mg, Nightly    hydroCHLOROthiazide (Microzide) 12.5 mg tablet Take 1 tablet (12.5 mg) by mouth once daily.    methocarbamol (ROBAXIN) 500 mg, oral, 4 times daily PRN    metoclopramide (Reglan) 10 mg tablet 1 tablet, Every 6 hours    oxyCODONE (Roxicodone) 5 mg immediate release tablet Take 1-2 tablet (5  -10 mg) by mouth every 6 hours if needed for severe pain (7 - 10).    potassium chloride ER (Micro-K) 10 mEq ER capsule 10 mEq, Daily    rosuvastatin (CRESTOR) 5 mg, Daily    spironolactone (ALDACTONE) 25 mg, Daily    Synthroid 100 mcg tablet Take 1 tablet (100 mcg) by mouth once daily at bedtime. Except 1/2 tablet on sundays

## 2025-01-14 ENCOUNTER — HOSPITAL ENCOUNTER (OUTPATIENT)
Dept: RADIOLOGY | Facility: HOSPITAL | Age: 50
Discharge: HOME | End: 2025-01-14
Payer: COMMERCIAL

## 2025-01-14 DIAGNOSIS — Q01.9 ENCEPHALOCELE: ICD-10-CM

## 2025-01-14 DIAGNOSIS — H66.11 CHRONIC TUBOTYMPANIC SUPPURATIVE OTITIS MEDIA OF RIGHT EAR: ICD-10-CM

## 2025-01-14 PROCEDURE — 70450 CT HEAD/BRAIN W/O DYE: CPT | Performed by: RADIOLOGY

## 2025-01-14 PROCEDURE — 70450 CT HEAD/BRAIN W/O DYE: CPT

## 2025-01-17 ENCOUNTER — TELEPHONE (OUTPATIENT)
Dept: NEUROSURGERY | Facility: HOSPITAL | Age: 50
End: 2025-01-17
Payer: COMMERCIAL

## 2025-02-18 DIAGNOSIS — Q01.9 ENCEPHALOCELE: ICD-10-CM

## 2025-02-19 RX ORDER — ACETAZOLAMIDE 125 MG/1
125 TABLET ORAL 2 TIMES DAILY
Qty: 60 TABLET | Refills: 1 | Status: SHIPPED | OUTPATIENT
Start: 2025-02-19 | End: 2025-04-20

## 2025-02-20 DIAGNOSIS — Q01.9 ENCEPHALOCELE: ICD-10-CM

## 2025-02-20 DIAGNOSIS — G44.89 OTHER HEADACHE SYNDROME: ICD-10-CM

## 2025-02-20 DIAGNOSIS — Z86.61 HISTORY OF BACTERIAL MENINGITIS: ICD-10-CM

## 2025-03-18 ENCOUNTER — TELEPHONE (OUTPATIENT)
Dept: OTOLARYNGOLOGY | Facility: CLINIC | Age: 50
End: 2025-03-18
Payer: COMMERCIAL

## 2025-03-18 NOTE — TELEPHONE ENCOUNTER
Spoke with patient regarding appointment with Neurology. She is looking for a provider near the [place] area. Message sent to neurosurgery and ENT for recommendations. She has a tentative appointment with a provider at  on 4/30.

## 2025-04-30 ENCOUNTER — APPOINTMENT (OUTPATIENT)
Dept: NEUROLOGY | Facility: CLINIC | Age: 50
End: 2025-04-30
Payer: COMMERCIAL

## 2025-08-27 ENCOUNTER — HOSPITAL ENCOUNTER (OUTPATIENT)
Dept: RADIOLOGY | Facility: HOSPITAL | Age: 50
Discharge: HOME | End: 2025-08-27
Payer: COMMERCIAL

## 2025-08-27 DIAGNOSIS — Z98.890 OTHER SPECIFIED POSTPROCEDURAL STATES: ICD-10-CM

## 2025-08-27 PROCEDURE — 70551 MRI BRAIN STEM W/O DYE: CPT | Performed by: RADIOLOGY

## 2025-08-27 PROCEDURE — 70551 MRI BRAIN STEM W/O DYE: CPT

## (undated) DEVICE — Device

## (undated) DEVICE — PAD, GROUNDING, ELECTROSURGICAL, W/9 FT CABLE, POLYHESIVE II, ADULT, LF

## (undated) DEVICE — DRAPE, SURGICAL, OTOLOGY GLASSCOCK

## (undated) DEVICE — CATHETER TRAY, SURESTEP, 16FR, URINE METER W/STATLOCK

## (undated) DEVICE — NEEDLE, HYPODERMIC, MONOJECT, 27 G X 1.5 IN

## (undated) DEVICE — DRAPE PACK, CRANIOTOMY, CUSTOM, UHC

## (undated) DEVICE — NEEDLE, ELECTRODE, SUBDERMAL, PAIRED, 2.0 LEAD, DISP

## (undated) DEVICE — DRESSING, MEPILEX, BORDER, HEEL, 8.7 X 9.1 IN

## (undated) DEVICE — BUR, ROUTER BIT, FA2, TAPERED, 2.3MM

## (undated) DEVICE — DRESSING, MEPILEX, BORDER, SACRUM, 8.7 X 9.8 IN

## (undated) DEVICE — SEALANT, HEMOSTATIC, FLOSEAL, 10 ML

## (undated) DEVICE — BAND, RUBBER, 3 IN, STERILE

## (undated) DEVICE — NEEDLE, HYPODERMIC, 25 G X 1.5 IN, A BEVEL, STERILE

## (undated) DEVICE — BLADE, OSCILLATING/SAGITTAL, 25MM X 9MM

## (undated) DEVICE — GOWN, SURGICAL, SMARTGOWN, LARGE, STERILE

## (undated) DEVICE — TUBING, SUCTION, OTOMED

## (undated) DEVICE — STOCKINETTE, IMPERVIOUS, 12 X 48 IN, LF, STERILE

## (undated) DEVICE — SEALANT, DURASEAL, 5ML

## (undated) DEVICE — CONTAINER, SPECIMEN, 120 ML, STERILE

## (undated) DEVICE — STRIP, SKIN CLOSURE, STERI STRIP, REINFORCED, 0.5 X 4 IN

## (undated) DEVICE — ELECTRODE, PAIRED SUBDERMAL OTO

## (undated) DEVICE — WAX, BONE, 2.5 GM

## (undated) DEVICE — CLEANER, WIPE, INSTRUMENT, 3.25 X 3.25 IN

## (undated) DEVICE — ELECTRODE, ELECTROSURGICAL, BLADE, INSULATED, ENT/IMA, STERILE

## (undated) DEVICE — BAG, PLASTIC, 10 X 17 IN

## (undated) DEVICE — BOWL, BASIN, 32 OZ, STERILE

## (undated) DEVICE — TUBING, SUCTION, CONNECTING, STERILE 0.25 X 120 IN., LF

## (undated) DEVICE — SYRINGE, 1 CC, LUER LOCK

## (undated) DEVICE — DRAPE, TOWEL, STERI DRAPE, 17 X 11 IN, PLASTIC, STERILE

## (undated) DEVICE — PROBE, STIMULATOR, MONOPOLAR, FLUSH TIP, PRASS, W/HANDLE, STANDARD

## (undated) DEVICE — DRAPE, MICROSCOPE, FOR ZEISS 65MM, VARI-LENS II, 52 X 150

## (undated) DEVICE — DRAPE, SMARTDRAPE, FOR TIVATO MICROSCOPE

## (undated) DEVICE — DRESSING, TRANSPARENT, TEGADERM, 2-3/8 X 2-3/4 IN

## (undated) DEVICE — DRESSING, EAR, GLASSCOCK, ADULT

## (undated) DEVICE — BUR, ACORN 9MM PRECISION

## (undated) DEVICE — MANIFOLD, 4 PORT NEPTUNE STANDARD

## (undated) DEVICE — SPONGE, HEMOSTATIC, CELLULOSE, SURGICEL, 2 X 14 IN

## (undated) DEVICE — COVER, TABLE, UHC

## (undated) DEVICE — RETRACTOR, HOOK, FISH, NEURO

## (undated) DEVICE — REST, HEAD, BAGEL, 9 IN

## (undated) DEVICE — GOWN, SURGICAL, SMARTGOWN, XLARGE, STERILE

## (undated) DEVICE — CUP, SOLUTION

## (undated) DEVICE — SPONGE, LAP, XRAY DECT, SC+RFID, 12X12, STERILE

## (undated) DEVICE — TUBING, SUCTION, MICROSURGICAL, MICROVAC, 5 FR

## (undated) DEVICE — NEEDLE, HYPODERMIC, MONOJECT, TRI-BEVELED, ANTI-CORING, 27 G X 1.25 IN, LUER LOCK HUB, YELLOW

## (undated) DEVICE — MARKER, SKIN, RULER AND LABEL PACK, CUSTOM

## (undated) DEVICE — SUTURE, NUROLON, 4-0, 18 IN, TF, CONTROL RELEASE, MULTIPACK, BLACK

## (undated) DEVICE — SPONGE GAUZE, XRAY SC+RFID, 4X4 16 PLY, STERILE

## (undated) DEVICE — SPONGE, HEMOSTAT, SURGICEL FIBRILLAR, ABS, 4 X 4, LF

## (undated) DEVICE — TAPE, SILK, DURAPORE, 3 IN X 10 YD, LF

## (undated) DEVICE — APPLICATOR, PREP, CHLORAPREP, W/ORANGE TINT, 10.5ML

## (undated) DEVICE — COVER, CART, 45 X 27 X 48 IN, CLEAR

## (undated) DEVICE — PROTECTOR, NERVE, ULNAR, PINK

## (undated) DEVICE — DRESSING, GAUZE, PETROLATUM, PATCH, XEROFORM, 1 X 8 IN, STERILE

## (undated) DEVICE — SUTURE, MONOCRYLIC, 4-0, P3, MONO 18

## (undated) DEVICE — CATHETER, IV, ANGIOCATH, 20 G X 1.88 IN, FEP POLYMER

## (undated) DEVICE — TUBING, SMOKE EVAC, 3/8 X 10 FT

## (undated) DEVICE — SUTURE, VICRYL, 3-0,18 IN, SH, UNDYED